# Patient Record
Sex: MALE | URBAN - METROPOLITAN AREA
[De-identification: names, ages, dates, MRNs, and addresses within clinical notes are randomized per-mention and may not be internally consistent; named-entity substitution may affect disease eponyms.]

---

## 2024-01-16 ENCOUNTER — ALLIED HEALTH (OUTPATIENT)
Dept: INTEGRATIVE MEDICINE | Facility: CLINIC | Age: 49
End: 2024-01-16
Payer: COMMERCIAL

## 2024-01-16 DIAGNOSIS — M54.2 CERVICALGIA: ICD-10-CM

## 2024-01-16 DIAGNOSIS — M79.10 MYALGIA: ICD-10-CM

## 2024-01-16 DIAGNOSIS — M99.01 SEGMENTAL AND SOMATIC DYSFUNCTION OF CERVICAL REGION: Primary | ICD-10-CM

## 2024-01-16 DIAGNOSIS — M99.03 SEGMENTAL AND SOMATIC DYSFUNCTION OF LUMBAR REGION: ICD-10-CM

## 2024-01-16 DIAGNOSIS — M99.02 SEGMENTAL AND SOMATIC DYSFUNCTION OF THORACIC REGION: ICD-10-CM

## 2024-01-16 DIAGNOSIS — M79.9 POSTURAL STRAIN: ICD-10-CM

## 2024-01-16 PROCEDURE — 99202 OFFICE O/P NEW SF 15 MIN: CPT | Performed by: CHIROPRACTOR

## 2024-01-16 PROCEDURE — 98941 CHIROPRACT MANJ 3-4 REGIONS: CPT | Performed by: CHIROPRACTOR

## 2024-01-16 ASSESSMENT — ENCOUNTER SYMPTOMS
DIZZINESS: 0
NECK STIFFNESS: 1
UNEXPECTED WEIGHT CHANGE: 0
ARTHRALGIAS: 0
NECK PAIN: 1
BACK PAIN: 0
WEAKNESS: 0
NUMBNESS: 0
DIFFICULTY URINATING: 0
LIGHT-HEADEDNESS: 0
FATIGUE: 0
TROUBLE SWALLOWING: 0
CHEST TIGHTNESS: 0
HEADACHES: 0
JOINT SWELLING: 0
VOMITING: 0
FEVER: 0
CHILLS: 0
MYALGIAS: 1
SHORTNESS OF BREATH: 0
FLANK PAIN: 0

## 2024-01-16 NOTE — PROGRESS NOTES
Subjective   Patient ID: Louie Huynh is a 49 y.o. male who presents today, January 16, 2024 for a new patient evaluation and treatment of neck pain.    (1/20) [facility]    Chiropractic Medicine HPI:  Provacative factors include : sitting  Palliative factors incude : movement stretching  Pain is described as : ache dull stiff   Previous treatment for complaint has included : exercise  Patient denies : difficulty walking bladder weakness bowel weakness catching clicking grinding instability numbness popping radiating pain into the distal extremity trauma  Intensity of the pain since last visit: Patient rates least severe pain at : 1/10 Patient rates most severe pain at : 4/10  Oswestry Disability Index has been completed: yes     Louie presents for evaluation and treatment of neck pain and stiffness. Patient states that he fractured his right collar bone about 20 years ago playing basketball. He states that since then he has had intermittent neck pain and stiffness. He states that he occasionally has right arm/shoulder weakness. He states that symptoms are mild today. He states that he sprained his right ankle in July and has residual ankle pain and tightness. He states that he has pain when he runs. Patient denies any headaches, difficulty speaking/swallowing, vision changes, bowel/bladder issues, chest pain/shortness of breath, numbness/tingling. He states that he had chiropractic care about 8 years ago and noted benefit from care.            Objective   Review of Systems   Constitutional:  Negative for chills, fatigue, fever and unexpected weight change.   HENT:  Negative for trouble swallowing.    Eyes:  Negative for visual disturbance.   Respiratory:  Negative for chest tightness and shortness of breath.    Cardiovascular:  Negative for chest pain and leg swelling.   Gastrointestinal:  Negative for vomiting.   Genitourinary:  Negative for difficulty urinating and flank pain.   Musculoskeletal:  Positive for  myalgias, neck pain and neck stiffness. Negative for arthralgias, back pain, gait problem and joint swelling.   Neurological:  Negative for dizziness, weakness, light-headedness, numbness and headaches.   Psychiatric/Behavioral:  Negative for self-injury and suicidal ideas.    All other systems reviewed and are negative.    Physical Exam  Constitutional:       General: He is not in acute distress.     Appearance: Normal appearance.       HENT:      Head: Normocephalic and atraumatic.   Musculoskeletal:      Cervical back: Tenderness present. Decreased range of motion.      Thoracic back: Tenderness present.      Lumbar back: Tenderness present.   Neurological:      General: No focal deficit present.      Mental Status: He is alert and oriented to person, place, and time.      Cranial Nerves: No dysarthria or facial asymmetry.      Sensory: Sensation is intact.      Motor: Motor function is intact.      Coordination: Coordination is intact.      Gait: Gait is intact.   Psychiatric:         Attention and Perception: Attention normal.         Mood and Affect: Mood normal.         Speech: Speech normal.         Behavior: Behavior is cooperative.        Spine Musculoskeletal Exam    Gait    Gait is normal.    Inspection    Shoulder elevation: right    Cervical Spine    Cervical spine inspection additional comments: Forward head carriage.    Palpation    Cervical Spine    Tenderness: present      Paraspinous: right and left      Trapezius: right and left      Periscapular: right and left    Right      Muscle tone: increased    Left      Muscle tone: increased    Thoracolumbar    Tenderness: present      Paraspinous: right and left    Right      Muscle tone: increased    Left      Muscle tone: increased    Range of Motion    Cervical Spine       Cervical flexion: 1-2 finger breadths. Cervical flexion detail: no pain.     Cervical extension: reduced extension (0-25%). Cervical extension detail: pain.       Right       Lateral bending: reduced bend (0-25%). Lateral bending detail: pain.       Lateral rotation: reduced rotation (0-25%). Lateral rotation detail: no pain.       Left      Lateral bending: reduced bend (0-25%). Lateral bending detail: no pain.       Lateral rotation: reduced rotation (0-25%). Lateral rotation detail: no pain.      Strength    Cervical Spine    Cervical spine motor exam is normal.    Sensory    Cervical Spine    Cervical spine sensation is normal.    Special Tests    Cervical Spine    Cervical distraction - neck: decreased pain    General    Neurological: alert     Other Orthopedic Tests:  Cervical: Right Maximum compression with local pain.  Left Maximum compression painless.  Right Shoulder depression with local pain.  Left Shoulder depression painless.  Segmental Joint(s): Segmental joint dysfunction was assessed with motion palpation and is identified in the following areas:  Cervical : C4 C5 C6 C7  Thoracic : T2., T5, and T9  Lumbopelvic / Sacral SIJ : L4, L5, and L5/S1  Upper / Lower Extremities : R Ankle and R Talocrural      Assessment/Plan   Today's Treatment Included: Chiropractic manipulation to the Segmental Joint(s) Cervical : C4 C5 C6 C7 Segmental Joint(s) Lumbopelvic/Sacral SIJ : L4, L5, and L5/S1 Segmental Joint(s) Thoracic : T2., T5, and T9 Segmental Joints Upper/Lower Extremities : R Ankle and R Talocrural  Treatment Techniques Used : Diversified CMT and Manual traction  Pin and stretch    Soft-tissue mobilization was performed in the following areas:   Cervical paraspinal mm. bilateral, Upper Trapezius bilateral, and Levator Scap. bilateral  Thoracic Paraspinal mm. bilateral  Lumbar Paraspinal mm. bilateral      Supraspinatus right and Pectoralis mm. right      Treatment Plan: The patient and I discussed the risks and benefits of Chiropractic Care.  Based on the patient's subjective complaints along with the examination findings, it is advised that a course of Chiropractic Treatment  by initiated in the form of:   Chiropractic Manipulation (CMT)  Neuro-Muscular Re-education  Integrative Dry Needing (IDN)  Chiropractic treatment recommended at a frequency of 1 times per week for 1 weeks  Consent for care was given both written and orally by the patient.  The goals of the treatment will be to: decrease pain, increase range of motion, improve quality of life, and decrease muscular hypertonicity  The patient tolerated today's treatment with little or no additional discomfort and was instructed to contact the office for questions or concerns.   Follow up as scheduled.

## 2024-01-24 ENCOUNTER — OFFICE VISIT (OUTPATIENT)
Dept: ORTHOPEDIC SURGERY | Facility: HOSPITAL | Age: 49
End: 2024-01-24
Payer: COMMERCIAL

## 2024-01-24 ENCOUNTER — HOSPITAL ENCOUNTER (OUTPATIENT)
Dept: RADIOLOGY | Facility: HOSPITAL | Age: 49
Discharge: HOME | End: 2024-01-24
Payer: COMMERCIAL

## 2024-01-24 VITALS — BODY MASS INDEX: 27.28 KG/M2 | WEIGHT: 180 LBS | HEIGHT: 68 IN

## 2024-01-24 DIAGNOSIS — S93.401A MODERATE RIGHT ANKLE SPRAIN, INITIAL ENCOUNTER: Primary | ICD-10-CM

## 2024-01-24 DIAGNOSIS — M25.571 RIGHT ANKLE PAIN, UNSPECIFIED CHRONICITY: ICD-10-CM

## 2024-01-24 PROCEDURE — 99213 OFFICE O/P EST LOW 20 MIN: CPT | Performed by: PHYSICIAN ASSISTANT

## 2024-01-24 PROCEDURE — 73610 X-RAY EXAM OF ANKLE: CPT | Mod: RIGHT SIDE | Performed by: RADIOLOGY

## 2024-01-24 PROCEDURE — 99203 OFFICE O/P NEW LOW 30 MIN: CPT | Performed by: PHYSICIAN ASSISTANT

## 2024-01-24 PROCEDURE — 73610 X-RAY EXAM OF ANKLE: CPT | Mod: RT

## 2024-01-24 ASSESSMENT — PAIN SCALES - GENERAL: PAINLEVEL_OUTOF10: 2

## 2024-01-24 ASSESSMENT — PAIN - FUNCTIONAL ASSESSMENT: PAIN_FUNCTIONAL_ASSESSMENT: 0-10

## 2024-01-24 ASSESSMENT — PAIN DESCRIPTION - DESCRIPTORS: DESCRIPTORS: ACHING

## 2024-01-25 NOTE — PROGRESS NOTES
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 He states that in July of 2023, he was playing basketball with his son and landed on someone's foot, causing him to invert and sprain his ankle. Since that injury, he states his ankle has not healed well. He states his pain is sharp and \"annoying\" in nature. His says it is located on the lateral aspect of his ankle and also the posterior aspect. The pain comes and goes but is most prominent when trying physical activity such as running or playing sports. However, after going on a run twice this weekend, he states it was feeling better. He has tried physical therapy without relief. This PT was located in [place], which he states is too far from his home to have gone consistently. Last month in December, he states he had an MRI completed at [facility] and the results were negative for any injury. He is here today looking for more resources to rehab his ankle closer to home. He denies new injury, numbness or tingling in the foot or ankle.    No past medical history on file.    No past surgical history on file.        ROS:  All pertinent positives listed above in the HPI.    EXAM: RLE  Compartments warm and compressible  Skin without lacerations, swelling, ecchymosis, or sign of injury or infection  TTP along the lateral and posterior right ankle  ROM intact  Strength 5/5  PT/DP pulses 2+    IMAGING  Remote appearing avulsion fracture of the medial malleolus. Lucency  through the medial talar dome consistent with an osteochondral  defect. No acute fracture or dislocation seen. No degenerative  changes.    ASSESSMENT:  Right ankle sprain with continued pain    PLAN:  We discussed with the patient that this is likely an old injury that did not heal properly and he would benefit from functional rehabilitation. We provided the patient with resources for PT near home as well as names and contacts for foot and ankle " specialists.  Advised he bring his MRI images to his follow up if he continues to have pain.  We recommended activity as tolerated and he may follow up as needed.

## 2024-03-01 NOTE — PROGRESS NOTES
Physical Therapy  Physical Therapy Orthopedic Evaluation    Patient Name: Louie Huynh  MRN: [id number]  Today's Date: 3/1/2024       Insurance:  Visit number: *** of ***  Authorization info: ***  Insurance Type: ***  Cert start date: ***; Cert end date: ***     General:  Reason for visit: Moderate R ankle sprain  Referred by: Mindy August PA-C    Assessment: Patient presents with signs and symptoms consistent with ***, resulting in limited participation in pain-free ADLs and inability to perform at their prior level of function. Pt would benefit from physical therapy to address the impairments found & listed previously in the objective section in order to return to safe and pain-free ADLs and prior level of function.       Plan:     Planned Interventions include: therapeutic exercise, self-care home management, manual therapy, therapeutic activities, gait training, neuromuscular coordination, vasopneumatic, dry needling, aquatic therapy  Frequency: {Frequency:99932}  Duration: {Duration:07436}    Current Problem:  No diagnosis found.    Precautions:          Medical History Form: Reviewed (scanned into chart)    Subjective:   Subjective   Chief Complaint: ***  Onset: ***  LISA: ***    Current Condition:   {Current Condition:51310}    Pain:     Location: ***  Description: ***  Aggravating Factors:  {Aggravating Factors:40385}  Relieving Factors:  {Relieving Factors:75087}    Relevant Information (PMH & Previous Tests/Imaging): Pt presents to PT with c/o chronic R posterolateral ankle pain after sustaining inversion injury in July of 2023 while playing basketball. Pain is sharp and exacerbated with physical activity. H/o PT but notes fair attendance.   R Ankle XR 1/24/24: osteochondral defect in medial talar dome, remove avulsion injury to medial malleolus   Previous Interventions/Treatments: {Previous Interventions/Treatments:12217}    Prior Level of Function (PLOF)  Patient previously independent with all  ADLs  Exercise/Physical Activity: ***  Work/School: ***    Patients Living Environment: Reviewed and no concern    Primary Language: English    Patient's Goal(s) for Therapy: ***    Patient Specific Functional Scale (0 = unable to perform; 10 = able to perform activity at same level as before injury or problem)  Activity Current Follow Up Discharge                     Total score = sum of the activity scores/number of activities  Minimum detectable change (90%CI) for average score = 2 points  Minimum detectable change (90%CI) for single activity score = 3 points    Red Flags: Do you have any of the following? {Yes/No:48812}  Fever/chills, unexplained weight changes, dizziness/fainting, unexplained change in bowel or bladder functions, unexplained malaise or muscle weakness, night pain/sweats, numbness or tingling    Objective:  Objective     Posture: ***    Palpation: ***    Ankle AROM  DF R ***; L ***  PF R ***; L ***  Inv R ***; L ***  Ev R ***; L ***  Great toe ext R ***; L ***    Ankle MMT  DF R ***; L ***  Inv R ***; L ***  Ev R ***; L ***  Great toe ext R ***; L ***  Great toe flex R ***; L ***  Toe ext R ***; L ***  Toe flex R ***; L ***    Gross Hip/Knee MMT:  - R ***  - L ***    Special Tests:  Ankle Sprain:  - Medial Tilt Test: ***  - Lateral Tilt Test: ***  - Anterior Drawer Test: ***  - External Rotation Test: ***  Achilles Tendinopathy:  - Cash Test: ***  - Achilles Tendon Palpation: ***    WB Lunge DF ROM Test:  - R ***  - L ***  (>2.5 cm difference side to side increases risk of lateral ankle tyson)    SL Heel Raise  R ***  L ***    Girth  Figure 8: R ***; L ***  Malleoli: R ***; L ***    Joint Mobility  - Talocrural: R ***; L ***  - Subtalar: R ***; L ***    SL Balance  - R ***  - L ***    Lower Extremity Functional Movements  Transfers: ***  Gait: ***  SL Balance: ***  DL Squat: ***  SL Squat: ***    Outcome Measures:  {PT Outcome Measures:58127}     EDUCATION: Home exercise program, plan of  care, activity modifications, pain management, and injury pathology       Goals: Set and discussed today      Plan of care was developed with input and agreement by the patient    Treatment Performed:    Exercise Sets/Reps Comments                                        Charges:  Evaluation: *** complexity  Treatment: ***    Alessandra Conrad, PT

## 2024-03-05 ENCOUNTER — DOCUMENTATION (OUTPATIENT)
Dept: PHYSICAL THERAPY | Facility: CLINIC | Age: 49
End: 2024-03-05
Payer: COMMERCIAL

## 2024-03-05 ENCOUNTER — APPOINTMENT (OUTPATIENT)
Dept: PHYSICAL THERAPY | Facility: CLINIC | Age: 49
End: 2024-03-05
Payer: COMMERCIAL

## 2024-03-08 NOTE — PROGRESS NOTES
Physical Therapy  Physical Therapy Orthopedic Evaluation    Patient Name: Louie Huynh  MRN: [id number]  Today's Date: 3/12/2024  Time Calculation  Start Time: 1115  Stop Time: 1200  Time Calculation (min): 45 min    Insurance:  Visit number: 1 of 30  Authorization info: NAN  Insurance Type:  Healthy Choice Plan  Cert start date: NA; Cert end date: NA     General:  Reason for visit: Moderate R ankle sprain  Referred by: Mindy August PA-C    Assessment: Patient presents with signs and symptoms consistent with chronic ankle instability, pes planus, and possible achilles tendinopathy, resulting in limited participation in pain-free ADLs and inability to perform at their prior level of function. Pt would benefit from physical therapy to address the impairments found & listed previously in the objective section in order to return to safe and pain-free ADLs and prior level of function.       Plan:     Planned Interventions include: therapeutic exercise, self-care home management, manual therapy, therapeutic activities, gait training, neuromuscular coordination, vasopneumatic, dry needling, aquatic therapy  Frequency: once every 1-3 weeks  Duration: 8 visits    Current Problem:  1. Right ankle pain  Follow Up In Physical Therapy      2. Moderate right ankle sprain, initial encounter  Referral to Physical Therapy    Follow Up In Physical Therapy          Precautions:   Precautions  STEADI Fall Risk Score (The score of 4 or more indicates an increased risk of falling): 0  Precautions Comment: H/o R tibial fracture 6 years ago      Medical History Form: Reviewed (scanned into chart)    Subjective:   Subjective   Chief Complaint: Pt presents to PT with c/o chronic R posterolateral ankle pain after sustaining inversion injury in July of 2023 while playing basketball. Pain described as soreness and exacerbated with physical activity. H/o PT but notes fair attendance. Denies performing exercises since. Notes busy schedule d/t  "work/moving, did not address symptoms immediately, pushed through pain. Of note, R knee hyperextension injury 6 years ago, fractured tibia with residual achilles soreness. Additionally, h/o R plantar fasciitis. Has since returned to tennis and basketball, modified participation. Pt will be skiing in 2 weeks, states he skied 2 weeks ago and was cautious, felt discomfort. Moved 4x in the past year, increased heavy lifting during moves.  Onset: 7/2023  LISA: Inversion injury    Current Condition:   Better    Pain:  Pain Assessment: 0-10  Pain Score: 0 - No pain  Location: Lateral ankle, occ anterior ankle, occ calf/achilles  Description: Intermittent sore  Aggravating Factors:  tennis/basketball, descending>ascending stairs, not wearing a supportive shoe  Relieving Factors:  massage     Relevant Information (PMH & Previous Tests/Imaging):   R Ankle XR 1/24/24: osteochondral defect in medial talar dome, remote avulsion injury to medial malleolus; MRI at [facility]: bone marrow edema, ligaments/tendons unremarkable  Previous Interventions/Treatments: PT at [facility] (limited attendance d/t busy schedule/inconvenient location)    Prior Level of Function (PLOF) - 80%  Patient previously independent with all ADLs  Exercise/Physical Activity: lifting 2-3x/week, walking regularly, tennis 1x week, basketball at times, [employer]om[employer] at times, yoga at times  Work/School: Oncologist    Patients Living Environment: Reviewed and no concern    Primary Language: English    Patient's Goal(s) for Therapy: \"Back to functional health\"    Patient Specific Functional Scale (0 = unable to perform; 10 = able to perform activity at same level as before injury or problem)  Activity Current Follow Up Discharge   Running 7     Stairs 6     Tennis 5     Total score = sum of the activity scores/number of activities  Minimum detectable change (90%CI) for average score = 2 points  Minimum detectable change (90%CI) for single activity score = 3 points    Red " Flags: Do you have any of the following? No  Fever/chills, unexplained weight changes, dizziness/fainting, unexplained change in bowel or bladder functions, unexplained malaise or muscle weakness, night pain/sweats, numbness or tingling    Objective:  Objective     Posture: R>L pes planus, R>L calcaneal valgus    Palpation: medial>lateral plantar fascia, mid achilles, calcaneus    Ankle AROM  DF R 15 deg; L 13 deg  PF R 45 deg; L 50 deg  Inv R 45 deg; L 41 deg  Ev R 5 deg; L 12 deg  Great toe ext R 51 deg; L 57 deg    Ankle MMT  DF R 4+/5; L 5/5  Inv R 4+/5; L 5/5  Ev R 4+/5; L 5/5  Great toe ext R 4/5; L 4+/5  Great toe flex R 4/5; L 4+/5  Toe ext R 4/5; L 4+/5  Toe flex R 4/5; L 4+/5    Gross Hip/Knee MMT:  - R 4 to 4+/5  - L 4 to 4+/5    Special Tests:  Ankle Sprain:  - Medial Tilt Test: (+)  - Lateral Tilt Test: (-)  - Anterior Drawer Test: (-)  - External Rotation Test: DNT  Achilles Tendinopathy:  - Cash Test: (-)  - Achilles Tendon Palpation: (+)    WB Lunge DF ROM Test:  - R 7.5cm  - L 10.5cm  (>2.5 cm difference side to side increases risk of lateral ankle tyson)    SL Heel Raise  R 25 (pain throughout)  L 17    Girth  Figure 8: R 24.5cm; L 24.5cm  Malleoli: R 56cm; L 55cm    Joint Mobility  - Talocrural: WFL; L WFL  - Subtalar: R WFL; L WFL    SL Balance  - R 20s+, increased unsteadiness/ankle strategies  - L 20s+    Lower Extremity Functional Movements  Transfers: WFL  Gait: medial foot collapse during stance phase  DL Squat: decreased depth secondary to R DF limitations, wide KAYLA with minimal dynamic knee valgus R>L, B pes planus    Outcome Measures:  Other Measures  Lower Extremity Funtional Score (LEFS): 67/80     EDUCATION: Home exercise program, plan of care, activity modifications, pain management, and injury pathology       Goals: Set and discussed today  Active       PT Problem       PT Goal 1       Start:  03/12/24    Expected End:  04/26/24       Patient will demonstrate home exercise  program adherence to supplement symptom reduction and functional gains made in clinic            PT Goal 2       Start:  03/12/24    Expected End:  06/10/24       Pt will demonstrate gross ankle MMT 5/5 to assist in return to PLOF   Pt will demonstrate gross foot intrinsic MMT 5/5 to improve functional stability with SL tasks   Pt will demonstrate <2.5cm difference on WB lunge DF ROM test to assist in injury prevention  Pt will perform 25 SL heel raises without pain R/L to improve ease with sports participation  Pt will demonstrate MCID (9 points) improvement on LEFS score (>76 points) to demonstrate improved functional ability  Pt will demonstrate MCID improvement on PSFS to demonstrate return to PLOF             Plan of care was developed with input and agreement by the patient    Treatment Performed:    HEP - ask for access code - toe yoga, gastroc stretch, soleus stretch, resisted eversion    Charges:  Evaluation: low complexity  Treatment: 1 JANIYA Conrad, PT

## 2024-03-12 ENCOUNTER — APPOINTMENT (OUTPATIENT)
Dept: PHYSICAL THERAPY | Facility: CLINIC | Age: 49
End: 2024-03-12
Payer: COMMERCIAL

## 2024-03-12 ENCOUNTER — EVALUATION (OUTPATIENT)
Dept: PHYSICAL THERAPY | Facility: CLINIC | Age: 49
End: 2024-03-12
Payer: COMMERCIAL

## 2024-03-12 DIAGNOSIS — M25.571 RIGHT ANKLE PAIN: Primary | ICD-10-CM

## 2024-03-12 DIAGNOSIS — S93.401A MODERATE RIGHT ANKLE SPRAIN, INITIAL ENCOUNTER: ICD-10-CM

## 2024-03-12 PROCEDURE — 97110 THERAPEUTIC EXERCISES: CPT | Mod: GP | Performed by: PHYSICAL THERAPIST

## 2024-03-12 PROCEDURE — 97161 PT EVAL LOW COMPLEX 20 MIN: CPT | Mod: GP | Performed by: PHYSICAL THERAPIST

## 2024-03-12 ASSESSMENT — PAIN - FUNCTIONAL ASSESSMENT: PAIN_FUNCTIONAL_ASSESSMENT: 0-10

## 2024-03-12 ASSESSMENT — ENCOUNTER SYMPTOMS
DEPRESSION: 0
OCCASIONAL FEELINGS OF UNSTEADINESS: 0
LOSS OF SENSATION IN FEET: 0

## 2024-03-12 ASSESSMENT — PAIN SCALES - GENERAL: PAINLEVEL_OUTOF10: 0 - NO PAIN

## 2024-03-15 NOTE — PROGRESS NOTES
"Physical Therapy  Physical Therapy Treatment    Patient Name: Louie Huynh  MRN: [id number]  Today's Date: 3/18/2024  Time Calculation  Start Time: 1738  Stop Time: 1836  Time Calculation (min): 58 min    Insurance:  Visit number: 2 of 30  Authorization info: NAN  Insurance Type:  Healthy Choice Plan  Cert start date: NA; Cert end date: NA      General:  Reason for visit: Moderate R ankle sprain  Referred by: Mindy August PA-C    Assessment: Good tolerance to prescribed exercise, noting appropriate challenge with most exercises. Denies change in symptoms after exercise. Updating HEP and instructed in performance. Ending session with vaso to assist in symptom and swelling management.        Plan: Continue to address strength, proprioception, and SL balance       Current Problem  1. Moderate right ankle sprain, initial encounter        2. Right ankle pain            Precautions:   Precautions  STEADI Fall Risk Score (The score of 4 or more indicates an increased risk of falling): 0  Precautions Comment: H/o R tibial fracture 6 years ago    Subjective:  Subjective   Pt presents to PT with c/o 3-4/10 ankle pain. Notes HEP adherence - \"I can definitely feel the soleus stretch\". Played tennis yesterday, feeling sore today.     Pain  Pain Assessment: 0-10  Pain Score: 4    Performing HEP?: Yes    Objective:  Objective   Unsteadiness with SLS    Treatments:    Exercise:  - Upright bike L3 5'   - Toe yoga x10  - Toe spread 2x10  - Resisted eversion GTB 2x10-15  - Resisted inversion GTB 2x10-15  - Heel raise off step x15, DL up/SL down x10  - 3 way heel raise x10-15 ea  - Soleus heel raise 2x10-15  - Standing DF 2x10-15  - SS GTB (around feet) x2 laps  - Step up + HK to BOSU 2x10  - SL balance on balance board x60\"  - 3 way ball toss SLS x15 ea  - DBE L12 UUE support x1, without UE support x1 (1:18)  - Y balance x8  - Gastroc slantboard stretch 90\"  - Soleus slantboard stretch 90\"    HEP - Access Code [email or web address] - toe yoga, " gastroc stretch, soleus stretch, resisted eversion     Alessandra Conrad, PT

## 2024-03-18 ENCOUNTER — TREATMENT (OUTPATIENT)
Dept: PHYSICAL THERAPY | Facility: CLINIC | Age: 49
End: 2024-03-18
Payer: COMMERCIAL

## 2024-03-18 DIAGNOSIS — M25.571 RIGHT ANKLE PAIN: ICD-10-CM

## 2024-03-18 DIAGNOSIS — S93.401A MODERATE RIGHT ANKLE SPRAIN, INITIAL ENCOUNTER: Primary | ICD-10-CM

## 2024-03-18 PROCEDURE — 97112 NEUROMUSCULAR REEDUCATION: CPT | Mod: GP | Performed by: PHYSICAL THERAPIST

## 2024-03-18 PROCEDURE — 97110 THERAPEUTIC EXERCISES: CPT | Mod: GP | Performed by: PHYSICAL THERAPIST

## 2024-03-18 PROCEDURE — 97016 VASOPNEUMATIC DEVICE THERAPY: CPT | Mod: GP | Performed by: PHYSICAL THERAPIST

## 2024-03-18 ASSESSMENT — PAIN SCALES - GENERAL: PAINLEVEL_OUTOF10: 4

## 2024-03-18 ASSESSMENT — PAIN - FUNCTIONAL ASSESSMENT: PAIN_FUNCTIONAL_ASSESSMENT: 0-10

## 2024-04-05 NOTE — PROGRESS NOTES
"Physical Therapy  Physical Therapy Treatment    Patient Name: Louie Huynh  MRN: [id number]  Today's Date: 4/5/2024       Insurance:  Visit number: 3 of 30  Authorization info: NAN  Insurance Type:  Healthy Choice Plan  Cert start date: NA; Cert end date: NA      General:  Reason for visit: Moderate R ankle sprain  Referred by: Mindy August PA-C    Assessment: Good tolerance to prescribed exercise, noting appropriate challenge with most exercises. Denies change in symptoms after exercise. Updating HEP and instructed in performance. Ending session with vaso to assist in symptom and swelling management.        Plan: Continue to address strength, proprioception, and SL balance       Current Problem  No diagnosis found.      Precautions:        Subjective:  Subjective   Pt presents to PT with c/o 3-4/10 ankle pain. Notes HEP adherence - \"I can definitely feel the soleus stretch\". Played tennis yesterday, feeling sore today.     Pain       Performing HEP?: Yes    Objective:  Objective   Unsteadiness with SLS    Treatments:    Exercise:  - Upright bike L3 5'   - Toe yoga x10  - Toe spread 2x10  - Resisted eversion GTB 2x10-15  - Resisted inversion GTB 2x10-15  - Heel raise off step x15, DL up/SL down x10  - 3 way heel raise x10-15 ea  - Soleus heel raise 2x10-15  - Standing DF 2x10-15  - SS GTB (around feet) x2 laps  - Step up + HK to BOSU 2x10  - SL balance on balance board x60\"  - 3 way ball toss SLS x15 ea  - DBE L12 UUE support x1, without UE support x1 (1:18)  - Y balance x8  - Gastroc slantboard stretch 90\"  - Soleus slantboard stretch 90\"    HEP - Access Code [email or web address] - toe yoga, gastroc stretch, soleus stretch, resisted eversion     Alessandra Conrad, PT  "

## 2024-04-09 ENCOUNTER — APPOINTMENT (OUTPATIENT)
Dept: PHYSICAL THERAPY | Facility: CLINIC | Age: 49
End: 2024-04-09
Payer: COMMERCIAL

## 2024-04-24 NOTE — PROGRESS NOTES
"Physical Therapy  Physical Therapy Treatment    Patient Name: Louie Huynh  MRN: [id number]  Today's Date: 4/30/2024       Insurance:  Visit number: 3 of 30  Authorization info: NAN  Insurance Type:  Healthy Choice Plan  Cert start date: NA; Cert end date: NA      General:  Reason for visit: Moderate R ankle sprain  Referred by: Mindy August PA-C    Assessment: Good tolerance to progressions in clinic. Emphasis on controlled movement and technique. Notes challenge with drills. Denies pain after session. Reinforcing proprioception drills.        Plan: Continue to address strength, proprioception, and SL balance       Current Problem  1. Moderate right ankle sprain, initial encounter        2. Right ankle pain              Precautions:   Precautions  STEADI Fall Risk Score (The score of 4 or more indicates an increased risk of falling): 0  Precautions Comment: H/o R tibial fracture 6 years ago    Subjective:  Subjective   Pt presents to PT without pain. Reports some HEP adherence 3-4x/week, \"I'm not working on the proprioception drills.\" States increased pain/swelling after pushing himself skiing, lasting ~1 week. Reports slow mild improvement with exercise.     Pain  Pain Assessment: 0-10  Pain Score: 0 - No pain    Performing HEP?: Yes    Objective:  Objective   Unsteadiness with SLS    Treatments:    Exercise:  - Upright bike L3 5'   - Toe yoga x10  - Toe spread 2x10  - Reviewing sock stretch  - Resisted eversion GTB 2x15  - DL up/SL down 2x10  - 3 way heel raise x10-15 ea  - Soleus heel raise 2x15 8kg KB - increase resistance NV  - Standing DF 2x10-15  - SS GTB (X around feet) x2 laps  - Step up + HK to BOSU 2x10  - SL balance on balance board x60\"  - Squat with BTB to cue knee alignment 2x10-15  - Standing hip abd BTB 2x10  - Steamboats BTB 2x10  - Partial lunge 2x10  - 3 way ball toss air exSLS x15 ea  - Biodex L12 without UE support x1 (52s)  - Y balance x8 - held 4/30  - Gastroc slantboard stretch 90\"  - " "Soleus slantboard stretch 90\"    HEP - Access Code [email or web address] - toe yoga, gastroc stretch, soleus stretch, resisted eversion     Alessandra Conrad, PT  "

## 2024-04-30 ENCOUNTER — TREATMENT (OUTPATIENT)
Dept: PHYSICAL THERAPY | Facility: CLINIC | Age: 49
End: 2024-04-30
Payer: COMMERCIAL

## 2024-04-30 DIAGNOSIS — S93.401A MODERATE RIGHT ANKLE SPRAIN, INITIAL ENCOUNTER: Primary | ICD-10-CM

## 2024-04-30 DIAGNOSIS — M25.571 RIGHT ANKLE PAIN: ICD-10-CM

## 2024-04-30 PROCEDURE — 97110 THERAPEUTIC EXERCISES: CPT | Mod: GP | Performed by: PHYSICAL THERAPIST

## 2024-04-30 PROCEDURE — 97112 NEUROMUSCULAR REEDUCATION: CPT | Mod: GP | Performed by: PHYSICAL THERAPIST

## 2024-04-30 ASSESSMENT — PAIN - FUNCTIONAL ASSESSMENT: PAIN_FUNCTIONAL_ASSESSMENT: 0-10

## 2024-04-30 ASSESSMENT — PAIN SCALES - GENERAL: PAINLEVEL_OUTOF10: 0 - NO PAIN

## 2024-05-24 NOTE — PROGRESS NOTES
"Physical Therapy  Physical Therapy Treatment    Patient Name: Louie Huynh  MRN: [id number]  Today's Date: 5/28/2024  Time Calculation  Start Time: 1115  Stop Time: 1218  Time Calculation (min): 63 min    Insurance:  Visit number: 4 of 30  Authorization info: NAN  Insurance Type:  Healthy Choice Plan  Cert start date: NA; Cert end date: NA      General:  Reason for visit: Moderate R ankle sprain  Referred by: Mindy August PA-C    Assessment: Reports decreased pain after AP TC jt mobs. Tolerating exercise well, reporting decrease in pain to 2/10 after exercise. Ending session with vaso for symptom management.       Plan: Continue to address strength, proprioception, and SL balance. Continue TC mobs as needed. Recheck NV. Add power and agility when able.       Current Problem  1. Moderate right ankle sprain, initial encounter        2. Right ankle pain                Precautions:   Precautions  STEADI Fall Risk Score (The score of 4 or more indicates an increased risk of falling): 0  Precautions Comment: H/o R tibial fracture 6 years ago    Subjective:  Subjective   Pt presents to PT with R anterior ankle soreness, rating as 4/10. States he played tennis yesterday. Denies pain with tennis, but feels sore afterward. Improved activity tolerance since beginning PT POC. Reports HEP adherence, however, some inconsistent due to being on call last week.     Pain  Pain Assessment: 0-10  Pain Score: 4    Performing HEP?: Yes    Objective:  Objective   Decreased pain post AP TC jt hypomobility    Treatments:  Manual: GIII AP TC jt mobs, TC jt distraction     Exercise:  - Upright bike L3 5'   - Resisted eversion GTB 2x15 - held 5/28  - DL up/SL down 2x10  - 3 way heel raise 2x10-15 ea  - Soleus heel raise 2x15 12kg KB  - Standing DF 2x10-15  - SS GTB (X around feet) x2 laps - held 5/28  - Step up + HK to BOSU 2x10  - SL balance on balance board x60\" - held 5/28  - Squat with BTB to cue knee alignment 2x10-15  - Standing hip abd " "BTB 2x10  - Steamboats BTB 2x10  - Partial lunge 4kg KB 2x10  - 3 way ball toss air ex SLS x10 ea  - Biodex L12 without UE support x1 (57s)  - Y balance x8   - Gastroc slantboard stretch 90\"  - Soleus slantboard stretch 90\"    HEP - Access Code [email or web address]    Alessandra Cnorad, PT  "

## 2024-05-28 ENCOUNTER — TREATMENT (OUTPATIENT)
Dept: PHYSICAL THERAPY | Facility: CLINIC | Age: 49
End: 2024-05-28
Payer: COMMERCIAL

## 2024-05-28 DIAGNOSIS — M25.571 RIGHT ANKLE PAIN: ICD-10-CM

## 2024-05-28 DIAGNOSIS — S93.401A MODERATE RIGHT ANKLE SPRAIN, INITIAL ENCOUNTER: Primary | ICD-10-CM

## 2024-05-28 PROCEDURE — 97016 VASOPNEUMATIC DEVICE THERAPY: CPT | Mod: GP | Performed by: PHYSICAL THERAPIST

## 2024-05-28 PROCEDURE — 97110 THERAPEUTIC EXERCISES: CPT | Mod: GP | Performed by: PHYSICAL THERAPIST

## 2024-05-28 PROCEDURE — 97112 NEUROMUSCULAR REEDUCATION: CPT | Mod: GP | Performed by: PHYSICAL THERAPIST

## 2024-05-28 ASSESSMENT — PAIN SCALES - GENERAL: PAINLEVEL_OUTOF10: 4

## 2024-05-28 ASSESSMENT — PAIN - FUNCTIONAL ASSESSMENT: PAIN_FUNCTIONAL_ASSESSMENT: 0-10

## 2024-06-18 ENCOUNTER — TREATMENT (OUTPATIENT)
Dept: PHYSICAL THERAPY | Facility: CLINIC | Age: 49
End: 2024-06-18
Payer: COMMERCIAL

## 2024-06-18 DIAGNOSIS — S93.401A MODERATE RIGHT ANKLE SPRAIN, INITIAL ENCOUNTER: Primary | ICD-10-CM

## 2024-06-18 DIAGNOSIS — M25.571 RIGHT ANKLE PAIN: ICD-10-CM

## 2024-06-18 PROCEDURE — 97110 THERAPEUTIC EXERCISES: CPT | Mod: GP | Performed by: PHYSICAL THERAPIST

## 2024-07-29 NOTE — PROGRESS NOTES
Physical Therapy  Physical Therapy Treatment    Patient Name: Louie Huynh  MRN: [id number]  Today's Date: 7/30/2024  Time Calculation  Start Time: 1032  Stop Time: 1113  Time Calculation (min): 41 min    Insurance:  Visit number: 6 of 30  Authorization info: NAN  Insurance Type:  Healthy Choice Plan  Cert start date: NA; Cert end date: NA      General:  Reason for visit: Moderate R ankle sprain  Referred by: Mindy August PA-C    Assessment: Pt notes pain with jumping that eases after AP TC jt mob. Educated on performing independently. Pt continues to exhibit R gastroc/soleus strength deficits as he is unable to perform SL heel raise without knee bend. Reinforcing DL up/SL down heel raises and DL power heel raises at home to improve strength before attempting hopping again. Tolerating agility well. Denies pain at end of session.       Plan: Continue TC mobs as needed. Continue to progress strength, power, and agility.       Current Problem  1. Moderate right ankle sprain, initial encounter  Follow Up In Physical Therapy      2. Right ankle pain  Follow Up In Physical Therapy                Precautions:   Precautions  STEADI Fall Risk Score (The score of 4 or more indicates an increased risk of falling): 0  Precautions Comment: H/o R tibial fracture 6 years ago    Subjective:  Subjective   Pt presents to PT without ankle pain, however, notes becoming frustrated when playing tennis or running uphill as he experiences R anterior ankle pain. Reports some HEP adherence.    Pain  Pain Assessment: 0-10  0-10 (Numeric) Pain Score: 4    Performing HEP?: Yes    Objective:  Objective   Decreased pain post AP TC jt hypomobility    Treatments:  Manual: GIII AP TC jt mobs, 1/2 kneeling MWM, educated on performing independently with band     Exercise:  - Upright bike L3 5'   - Heel raises off step 3x15 (power)  - DL hops 2x30 (increasing pain - relieved with AP TC jt mob)  - Agility: fwd step to, side step to, in/out  - Toe  "walking fwd x2 laps, lateral x2 laps   - Resisted walking (all 4 directions) x10 ea    Held 7/30:  - Resisted eversion GTB 2x15  - DL up/SL down 2x10  - 3 way heel raise 2x10-15 ea  - Soleus heel raise 2x15 12kg KB  - Standing DF 2x10-15  - SS GTB (X around feet) x2 laps  - Step up + HK to BOSU 2x10  - SL balance on balance board x60\"   - Squat with BTB to cue knee alignment 2x10-15  - Standing hip abd BTB 2x10  - Steamboats BTB 2x10  - Partial lunge 4kg KB 2x10  - 3 way ball toss air ex SLS x10 ea  - Biodex L12 without UE support x1 (57s)  - Y balance x8   - Gastroc slantboard stretch 90\"  - Soleus slantboard stretch 90\"    HEP - Access Code [email or web address]    Alessandra Conrad, PT  "

## 2024-07-30 ENCOUNTER — TREATMENT (OUTPATIENT)
Dept: PHYSICAL THERAPY | Facility: CLINIC | Age: 49
End: 2024-07-30
Payer: COMMERCIAL

## 2024-07-30 DIAGNOSIS — S93.401A MODERATE RIGHT ANKLE SPRAIN, INITIAL ENCOUNTER: ICD-10-CM

## 2024-07-30 DIAGNOSIS — M25.571 RIGHT ANKLE PAIN: ICD-10-CM

## 2024-07-30 PROCEDURE — 97140 MANUAL THERAPY 1/> REGIONS: CPT | Mod: GP | Performed by: PHYSICAL THERAPIST

## 2024-07-30 PROCEDURE — 97110 THERAPEUTIC EXERCISES: CPT | Mod: GP | Performed by: PHYSICAL THERAPIST

## 2024-07-30 ASSESSMENT — PAIN - FUNCTIONAL ASSESSMENT: PAIN_FUNCTIONAL_ASSESSMENT: 0-10

## 2024-07-30 ASSESSMENT — PAIN SCALES - GENERAL: PAINLEVEL_OUTOF10: 4

## 2024-08-13 ENCOUNTER — APPOINTMENT (OUTPATIENT)
Dept: PHYSICAL THERAPY | Facility: CLINIC | Age: 49
End: 2024-08-13
Payer: COMMERCIAL

## 2024-08-26 NOTE — PROGRESS NOTES
Physical Therapy  Physical Therapy Treatment    Patient Name: Louie Huynh  MRN: [id number]  Today's Date: 8/27/2024  Time Calculation  Start Time: 1030  Stop Time: 1115  Time Calculation (min): 45 min    Insurance:  Visit number: 7 of 30  Authorization info: NAN  Insurance Type:  Healthy Choice Plan  Cert start date: NA; Cert end date: NA      General:  Reason for visit: Moderate R ankle sprain  Referred by: Mindy August PA-C    Assessment: Pt able to perform SL heel raise with good form, demonstrating improved strength. Tolerating jumping this date without pain. Revisiting orthotics discussion this date, pt scheduling with Liborio Roach PTA. At end of session, denies pain. Recheck NV, likely pt will take time off to strength training independently d/t busy schedule over the next few months.       Plan: Continue TC mobs as needed. Continue to progress strength, power, and agility. SL hops NV. Recheck NV.       Current Problem  1. Right ankle pain  Follow Up In Physical Therapy      2. Moderate right ankle sprain, initial encounter  Follow Up In Physical Therapy                  Precautions:   Precautions  STEADI Fall Risk Score (The score of 4 or more indicates an increased risk of falling): 0  Precautions Comment: H/o R tibial fracture 6 years ago    Subjective:  Subjective   Pt presents to PT without ankle pain, stating he has been more consistent with HEP. Notes he hasn't been participating in sports d/t busy schedule. Has not tried running uphill, but running on flat surfaces is pain free. Believes he's seeing slow progress. Notes lifting heavy objects 2 days ago with minor increase in pain of steps the wrong way.    Pain  Pain Assessment: 0-10  0-10 (Numeric) Pain Score: 0 - No pain    Performing HEP?: Yes    Objective:  Objective   SL heel raise on RLE with knee extension through full range    Treatments:  Manual: GIII AP TC jt mobs    Exercise:  - Upright bike L4 5'   - Heel raises off step 3x15 (power)  -  "DL hops fwd/back 2x30   - DL hops side/side 2x30  - Agility: fwd step to, side step to, in/out  - Toe walking fwd x2 laps, lateral x2 laps   - Step up with high knee to heel raise 2x12 R/L  - Wall sit heel raise 2x15  - Resisted walking (all 4 directions) x10 ea    Held 8/27:  - Resisted eversion GTB 2x15  - DL up/SL down 2x10  - 3 way heel raise 2x10-15 ea  - Soleus heel raise 2x15 12kg KB  - Standing DF 2x10-15  - SS GTB (X around feet) x2 laps  - Step up + HK to BOSU 2x10  - SL balance on balance board x60\"   - Squat with BTB to cue knee alignment 2x10-15  - Standing hip abd BTB 2x10  - Steamboats BTB 2x10  - Partial lunge 4kg KB 2x10  - 3 way ball toss air ex SLS x10 ea  - Biodex L12 without UE support x1 (57s)  - Y balance x8   - Gastroc slantboard stretch 90\"  - Soleus slantboard stretch 90\"    Charges: 3 TE    HEP - Access Code [email or web address]    Alessandra Conrad, PT  "

## 2024-08-27 ENCOUNTER — TREATMENT (OUTPATIENT)
Dept: PHYSICAL THERAPY | Facility: CLINIC | Age: 49
End: 2024-08-27
Payer: COMMERCIAL

## 2024-08-27 DIAGNOSIS — M25.571 RIGHT ANKLE PAIN: Primary | ICD-10-CM

## 2024-08-27 DIAGNOSIS — S93.401A MODERATE RIGHT ANKLE SPRAIN, INITIAL ENCOUNTER: ICD-10-CM

## 2024-08-27 PROCEDURE — 97110 THERAPEUTIC EXERCISES: CPT | Mod: GP | Performed by: PHYSICAL THERAPIST

## 2024-08-27 ASSESSMENT — PAIN SCALES - GENERAL: PAINLEVEL_OUTOF10: 0 - NO PAIN

## 2024-08-27 ASSESSMENT — PAIN - FUNCTIONAL ASSESSMENT: PAIN_FUNCTIONAL_ASSESSMENT: 0-10

## 2024-09-03 ENCOUNTER — APPOINTMENT (OUTPATIENT)
Dept: PHYSICAL THERAPY | Facility: CLINIC | Age: 49
End: 2024-09-03
Payer: COMMERCIAL

## 2024-09-17 ENCOUNTER — APPOINTMENT (OUTPATIENT)
Dept: PHYSICAL THERAPY | Facility: CLINIC | Age: 49
End: 2024-09-17
Payer: COMMERCIAL

## 2024-09-26 ENCOUNTER — OFFICE VISIT (OUTPATIENT)
Dept: PRIMARY CARE | Facility: CLINIC | Age: 49
End: 2024-09-26
Payer: COMMERCIAL

## 2024-09-26 ENCOUNTER — APPOINTMENT (OUTPATIENT)
Dept: PHYSICAL THERAPY | Facility: CLINIC | Age: 49
End: 2024-09-26
Payer: COMMERCIAL

## 2024-09-26 ENCOUNTER — LAB (OUTPATIENT)
Dept: LAB | Facility: LAB | Age: 49
End: 2024-09-26
Payer: COMMERCIAL

## 2024-09-26 VITALS
HEART RATE: 53 BPM | OXYGEN SATURATION: 97 % | WEIGHT: 187 LBS | BODY MASS INDEX: 28.34 KG/M2 | DIASTOLIC BLOOD PRESSURE: 89 MMHG | SYSTOLIC BLOOD PRESSURE: 131 MMHG | HEIGHT: 68 IN

## 2024-09-26 DIAGNOSIS — T14.8XXA BRUISING: ICD-10-CM

## 2024-09-26 DIAGNOSIS — L29.9 ITCH: ICD-10-CM

## 2024-09-26 DIAGNOSIS — E55.9 VITAMIN D DEFICIENCY: ICD-10-CM

## 2024-09-26 DIAGNOSIS — R21 RASH: ICD-10-CM

## 2024-09-26 DIAGNOSIS — Z00.00 ROUTINE GENERAL MEDICAL EXAMINATION AT A HEALTH CARE FACILITY: Primary | ICD-10-CM

## 2024-09-26 DIAGNOSIS — Z13.6 SCREENING FOR HEART DISEASE: ICD-10-CM

## 2024-09-26 DIAGNOSIS — Z12.5 PROSTATE CANCER SCREENING: ICD-10-CM

## 2024-09-26 DIAGNOSIS — Z13.220 LIPID SCREENING: ICD-10-CM

## 2024-09-26 DIAGNOSIS — L81.9 PIGMENTED SKIN LESION: ICD-10-CM

## 2024-09-26 DIAGNOSIS — E53.8 VITAMIN B 12 DEFICIENCY: ICD-10-CM

## 2024-09-26 DIAGNOSIS — Z00.00 ROUTINE GENERAL MEDICAL EXAMINATION AT A HEALTH CARE FACILITY: ICD-10-CM

## 2024-09-26 LAB
25(OH)D3 SERPL-MCNC: 38 NG/ML (ref 30–100)
ALBUMIN SERPL BCP-MCNC: 4.3 G/DL (ref 3.4–5)
ALP SERPL-CCNC: 65 U/L (ref 33–120)
ALT SERPL W P-5'-P-CCNC: 21 U/L (ref 10–52)
ANION GAP SERPL CALC-SCNC: 12 MMOL/L (ref 10–20)
APPEARANCE UR: CLEAR
APTT PPP: 34 SECONDS (ref 27–38)
AST SERPL W P-5'-P-CCNC: 16 U/L (ref 9–39)
BASOPHILS # BLD AUTO: 0.07 X10*3/UL (ref 0–0.1)
BASOPHILS NFR BLD AUTO: 2.1 %
BILIRUB SERPL-MCNC: 0.9 MG/DL (ref 0–1.2)
BILIRUB UR STRIP.AUTO-MCNC: NEGATIVE MG/DL
BUN SERPL-MCNC: 12 MG/DL (ref 6–23)
CALCIUM SERPL-MCNC: 9.3 MG/DL (ref 8.6–10.6)
CHLORIDE SERPL-SCNC: 106 MMOL/L (ref 98–107)
CHOLEST SERPL-MCNC: 173 MG/DL (ref 0–199)
CHOLESTEROL/HDL RATIO: 3.6
CO2 SERPL-SCNC: 29 MMOL/L (ref 21–32)
COLOR UR: ABNORMAL
COTININE UR QL SCN: NEGATIVE
CREAT SERPL-MCNC: 1.04 MG/DL (ref 0.5–1.3)
CRP SERPL-MCNC: <0.1 MG/DL
EGFRCR SERPLBLD CKD-EPI 2021: 87 ML/MIN/1.73M*2
EOSINOPHIL # BLD AUTO: 0.17 X10*3/UL (ref 0–0.7)
EOSINOPHIL NFR BLD AUTO: 5 %
ERYTHROCYTE [DISTWIDTH] IN BLOOD BY AUTOMATED COUNT: 12.9 % (ref 11.5–14.5)
EST. AVERAGE GLUCOSE BLD GHB EST-MCNC: 117 MG/DL
GLUCOSE SERPL-MCNC: 102 MG/DL (ref 74–99)
GLUCOSE UR STRIP.AUTO-MCNC: NORMAL MG/DL
HBA1C MFR BLD: 5.7 %
HCT VFR BLD AUTO: 43.3 % (ref 41–52)
HDLC SERPL-MCNC: 48.6 MG/DL
HGB BLD-MCNC: 14 G/DL (ref 13.5–17.5)
IMM GRANULOCYTES # BLD AUTO: 0.01 X10*3/UL (ref 0–0.7)
IMM GRANULOCYTES NFR BLD AUTO: 0.3 % (ref 0–0.9)
INR PPP: 1 (ref 0.9–1.1)
KETONES UR STRIP.AUTO-MCNC: NEGATIVE MG/DL
LDLC SERPL CALC-MCNC: 95 MG/DL
LEUKOCYTE ESTERASE UR QL STRIP.AUTO: ABNORMAL
LYMPHOCYTES # BLD AUTO: 1.15 X10*3/UL (ref 1.2–4.8)
LYMPHOCYTES NFR BLD AUTO: 34 %
MCH RBC QN AUTO: 28.4 PG (ref 26–34)
MCHC RBC AUTO-ENTMCNC: 32.3 G/DL (ref 32–36)
MCV RBC AUTO: 88 FL (ref 80–100)
MONOCYTES # BLD AUTO: 0.29 X10*3/UL (ref 0.1–1)
MONOCYTES NFR BLD AUTO: 8.6 %
MUCOUS THREADS #/AREA URNS AUTO: NORMAL /LPF
NEUTROPHILS # BLD AUTO: 1.69 X10*3/UL (ref 1.2–7.7)
NEUTROPHILS NFR BLD AUTO: 50 %
NITRITE UR QL STRIP.AUTO: NEGATIVE
NON HDL CHOLESTEROL: 124 MG/DL (ref 0–149)
NRBC BLD-RTO: 0 /100 WBCS (ref 0–0)
PH UR STRIP.AUTO: 5.5 [PH]
PLATELET # BLD AUTO: 145 X10*3/UL (ref 150–450)
POTASSIUM SERPL-SCNC: 4.5 MMOL/L (ref 3.5–5.3)
PROT SERPL-MCNC: 6.5 G/DL (ref 6.4–8.2)
PROT UR STRIP.AUTO-MCNC: NEGATIVE MG/DL
PROTHROMBIN TIME: 11.8 SECONDS (ref 9.8–12.8)
PSA SERPL-MCNC: 0.23 NG/ML
RBC # BLD AUTO: 4.93 X10*6/UL (ref 4.5–5.9)
RBC # UR STRIP.AUTO: NEGATIVE /UL
RBC #/AREA URNS AUTO: NORMAL /HPF
SODIUM SERPL-SCNC: 142 MMOL/L (ref 136–145)
SP GR UR STRIP.AUTO: 1.02
SQUAMOUS #/AREA URNS AUTO: NORMAL /HPF
TESTOST SERPL-MCNC: 265 NG/DL (ref 240–1000)
TRIGL SERPL-MCNC: 149 MG/DL (ref 0–149)
TSH SERPL-ACNC: 1.14 MIU/L (ref 0.44–3.98)
UROBILINOGEN UR STRIP.AUTO-MCNC: NORMAL MG/DL
VLDL: 30 MG/DL (ref 0–40)
WBC # BLD AUTO: 3.4 X10*3/UL (ref 4.4–11.3)
WBC #/AREA URNS AUTO: NORMAL /HPF

## 2024-09-26 PROCEDURE — 82172 ASSAY OF APOLIPOPROTEIN: CPT

## 2024-09-26 PROCEDURE — 3008F BODY MASS INDEX DOCD: CPT | Performed by: INTERNAL MEDICINE

## 2024-09-26 PROCEDURE — 85610 PROTHROMBIN TIME: CPT

## 2024-09-26 PROCEDURE — 80061 LIPID PANEL: CPT

## 2024-09-26 PROCEDURE — 85730 THROMBOPLASTIN TIME PARTIAL: CPT

## 2024-09-26 PROCEDURE — 90471 IMMUNIZATION ADMIN: CPT | Performed by: INTERNAL MEDICINE

## 2024-09-26 PROCEDURE — 85025 COMPLETE CBC W/AUTO DIFF WBC: CPT

## 2024-09-26 PROCEDURE — 84403 ASSAY OF TOTAL TESTOSTERONE: CPT

## 2024-09-26 PROCEDURE — 86140 C-REACTIVE PROTEIN: CPT

## 2024-09-26 PROCEDURE — 93000 ELECTROCARDIOGRAM COMPLETE: CPT | Performed by: INTERNAL MEDICINE

## 2024-09-26 PROCEDURE — 36415 COLL VENOUS BLD VENIPUNCTURE: CPT

## 2024-09-26 PROCEDURE — 99396 PREV VISIT EST AGE 40-64: CPT | Performed by: INTERNAL MEDICINE

## 2024-09-26 PROCEDURE — 81001 URINALYSIS AUTO W/SCOPE: CPT

## 2024-09-26 PROCEDURE — 83036 HEMOGLOBIN GLYCOSYLATED A1C: CPT

## 2024-09-26 PROCEDURE — 84443 ASSAY THYROID STIM HORMONE: CPT

## 2024-09-26 PROCEDURE — 82306 VITAMIN D 25 HYDROXY: CPT

## 2024-09-26 PROCEDURE — 82607 VITAMIN B-12: CPT

## 2024-09-26 PROCEDURE — 90656 IIV3 VACC NO PRSV 0.5 ML IM: CPT | Performed by: INTERNAL MEDICINE

## 2024-09-26 PROCEDURE — 84153 ASSAY OF PSA TOTAL: CPT

## 2024-09-26 PROCEDURE — 80053 COMPREHEN METABOLIC PANEL: CPT

## 2024-09-26 RX ORDER — CLOTRIMAZOLE AND BETAMETHASONE DIPROPIONATE 10; .64 MG/G; MG/G
1 CREAM TOPICAL 2 TIMES DAILY
Qty: 15 G | Refills: 1 | Status: SHIPPED | OUTPATIENT
Start: 2024-09-26 | End: 2024-10-11

## 2024-09-26 ASSESSMENT — ENCOUNTER SYMPTOMS
OCCASIONAL FEELINGS OF UNSTEADINESS: 0
LOSS OF SENSATION IN FEET: 0
DEPRESSION: 0

## 2024-09-26 ASSESSMENT — PATIENT HEALTH QUESTIONNAIRE - PHQ9
1. LITTLE INTEREST OR PLEASURE IN DOING THINGS: NOT AT ALL
2. FEELING DOWN, DEPRESSED OR HOPELESS: NOT AT ALL
SUM OF ALL RESPONSES TO PHQ9 QUESTIONS 1 AND 2: 0

## 2024-09-26 NOTE — PROGRESS NOTES
"Subjective   Patient ID: Louie Huynh is a 49 y.o. male who presents for New Patient Visit (Rash inner R groin area, consistent itchy to body/bilateral arm pits/brusing  ).    HPI patient presents to clinic to establish care. He is requesting a routine physical examination.  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  He  is also concerned about chronic rash noticed on his right groin area for the past few years.  He has also noticed pigmented lesion under his arms over the past 2 weeks.  His symptoms are associated with intermittent pruritus.  He is also concerned about some bruising.  He denies any fever, chills, weight loss, night sweats, swelling of legs, palpitation and diaphoresis.  EKG done shows sinus bradycardia at 49 bpm with normal axis normal interval without any ischemic changes.  Past medical history significant for low vitamin D and B12 deficiency, chronic right ankle injury with negative workup and dyslipidemia.  Past surgical history notable for normal colonoscopy 5 years ago done in [place]    Review of Systems   Constitutional: Negative.    HENT: Negative.     Eyes: Negative.    Respiratory: Negative.     Cardiovascular: Negative.    Gastrointestinal: Negative.    Endocrine: Negative.    Genitourinary: Negative.    Musculoskeletal: Negative.    Skin:  Positive for color change and rash.   Allergic/Immunologic: Negative.    Neurological: Negative.    Hematological: Negative.    Psychiatric/Behavioral: Negative.         Objective   /89 (BP Location: Left arm, Patient Position: Sitting)   Pulse 53   Ht 1.727 m (5' 8\")   Wt 84.8 kg (187 lb)   SpO2 97%   BMI 28.43 kg/m²     Physical Exam  Constitutional:       Appearance: Normal appearance. He is normal weight.   HENT:      Right Ear: Tympanic membrane normal.      Left Ear: Tympanic membrane and ear canal normal.      Nose: Nose normal.   Neck:      Vascular: No carotid bruit.   Cardiovascular:      Rate and Rhythm: Normal rate. "   Pulmonary:      Effort: No respiratory distress.      Breath sounds: No stridor. No wheezing.   Abdominal:      Palpations: Abdomen is soft.      Tenderness: There is no abdominal tenderness. There is no guarding or rebound.   Skin:     General: Skin is warm.      Coloration: Skin is not jaundiced.      Findings: Lesion and rash present. No bruising.      Comments: Hyperpigmented lesion noted under axilla and erythema noticed on the right groin area without any blister formation   Neurological:      General: No focal deficit present.      Mental Status: He is alert and oriented to person, place, and time.   Psychiatric:         Mood and Affect: Mood normal.         Assessment/Plan   Assessment & Plan  Routine general medical examination at a health care facility  Patient will be scheduled for routine blood work.  He will be notified about test results and will make further recommendations.  He is advised to return to clinic in 1 month for follow-up visit.  Orders:    Lipid Panel; Future    CBC and Auto Differential; Future    Hemoglobin A1c; Future    Comprehensive metabolic panel; Future    Prostate Spec.Ag,Screen; Future    Urinalysis with Reflex Microscopic; Future    ECG 12 lead (Clinic Performed)    Lipoprotein A (LPA); Future    Flu vaccine, trivalent, preservative free, age 6 months and greater (Fluarix/Fluzone/Flulaval)    Itch  He is advised to consider Benadryl regarding itching       Rash  He is given trial with Lotrisone ointment regarding his chronic right groin rash.  He is also advised to clean the skin with soap and water.  Orders:    TSH; Future    clotrimazole-betamethasone (Lotrisone) cream; Apply 1 Application topically 2 times a day for 15 days.    Vitamin D deficiency  Will check vitamin D level  Orders:    Vitamin D 25-Hydroxy,Total (for eval of Vitamin D levels); Future    Vitamin B 12 deficiency  Check B12 level  Orders:    Vitamin B12; Future    Lipid screening  Will check lipid panel and  LP(a)  Orders:    Lipoprotein A (LPA); Future    Bruising  Will check coagulation studies regarding intermittent bruising   Orders:    C-reactive protein; Future    Testosterone; Future    Protime-INR; Future    APTT; Future    Pigmented skin lesion  He is scheduled to follow-up with dermatology clinic regarding chronic rash and pigmented lesion under his arm.  Orders:    Testosterone; Future    Screening for heart disease  He is advised to obtain CT cardiac scoring regarding screening for heart disease.  Orders:    CT cardiac scoring wo IV contrast; Future    Lipoprotein A (LPA); Future    Prostate cancer screening  To check PSA  Orders:    Prostate Spec.Ag,Screen; Future

## 2024-09-27 LAB — VIT B12 SERPL-MCNC: 481 PG/ML (ref 211–911)

## 2024-09-27 ASSESSMENT — ENCOUNTER SYMPTOMS
ENDOCRINE NEGATIVE: 1
ALLERGIC/IMMUNOLOGIC NEGATIVE: 1
CONSTITUTIONAL NEGATIVE: 1
COLOR CHANGE: 1
CARDIOVASCULAR NEGATIVE: 1
GASTROINTESTINAL NEGATIVE: 1
EYES NEGATIVE: 1
MUSCULOSKELETAL NEGATIVE: 1
NEUROLOGICAL NEGATIVE: 1
HEMATOLOGIC/LYMPHATIC NEGATIVE: 1
RESPIRATORY NEGATIVE: 1
PSYCHIATRIC NEGATIVE: 1

## 2024-09-28 LAB — LPA SERPL-MCNC: 33 MG/DL

## 2024-10-07 ENCOUNTER — APPOINTMENT (OUTPATIENT)
Dept: ORTHOPEDIC SURGERY | Facility: HOSPITAL | Age: 49
End: 2024-10-07
Payer: COMMERCIAL

## 2024-11-05 ENCOUNTER — DOCUMENTATION (OUTPATIENT)
Dept: PHYSICAL THERAPY | Facility: CLINIC | Age: 49
End: 2024-11-05
Payer: COMMERCIAL

## 2024-11-05 NOTE — PROGRESS NOTES
Discharge Summary    Name: Louie Huynh  MRN: [id number]  : 1975  Date: 24    Discharge Summary: PT    Discharge Information: Date of discharge 24, Date of last visit 24, Date of evaluation 3/12/24, Number of attended visits 7, Referred by Mindy August PA-C, and Referred for Moderate R ankle sprain    Therapy Summary: Pt noting slow progress with PT POC    Discharge Status: No formal reassessment performed     Rehab Discharge Reason: Failed to schedule and/or keep follow-up appointment(s) and Other Time off to independently strength d/t busy schedule

## 2024-11-19 ENCOUNTER — APPOINTMENT (OUTPATIENT)
Dept: PRIMARY CARE | Facility: CLINIC | Age: 49
End: 2024-11-19
Payer: COMMERCIAL

## 2024-11-19 VITALS
OXYGEN SATURATION: 97 % | HEART RATE: 61 BPM | HEIGHT: 68 IN | SYSTOLIC BLOOD PRESSURE: 122 MMHG | WEIGHT: 187 LBS | BODY MASS INDEX: 28.34 KG/M2 | DIASTOLIC BLOOD PRESSURE: 84 MMHG

## 2024-11-19 DIAGNOSIS — D69.6 THROMBOCYTOPENIA (CMS-HCC): ICD-10-CM

## 2024-11-19 DIAGNOSIS — E53.8 VITAMIN B 12 DEFICIENCY: Primary | ICD-10-CM

## 2024-11-19 DIAGNOSIS — L29.9 CHRONIC PRURITUS: ICD-10-CM

## 2024-11-19 DIAGNOSIS — L29.9 ITCH: ICD-10-CM

## 2024-11-19 DIAGNOSIS — R73.9 HYPERGLYCEMIA: ICD-10-CM

## 2024-11-19 PROCEDURE — 3008F BODY MASS INDEX DOCD: CPT | Performed by: INTERNAL MEDICINE

## 2024-11-19 PROCEDURE — 99213 OFFICE O/P EST LOW 20 MIN: CPT | Performed by: INTERNAL MEDICINE

## 2024-11-19 ASSESSMENT — PATIENT HEALTH QUESTIONNAIRE - PHQ9
1. LITTLE INTEREST OR PLEASURE IN DOING THINGS: NOT AT ALL
SUM OF ALL RESPONSES TO PHQ9 QUESTIONS 1 AND 2: 0
2. FEELING DOWN, DEPRESSED OR HOPELESS: NOT AT ALL

## 2024-11-19 ASSESSMENT — LIFESTYLE VARIABLES
HOW MANY STANDARD DRINKS CONTAINING ALCOHOL DO YOU HAVE ON A TYPICAL DAY: PATIENT DOES NOT DRINK
SKIP TO QUESTIONS 9-10: 1
HOW OFTEN DO YOU HAVE SIX OR MORE DRINKS ON ONE OCCASION: NEVER
HOW OFTEN DO YOU HAVE A DRINK CONTAINING ALCOHOL: NEVER
AUDIT-C TOTAL SCORE: 0

## 2024-11-19 ASSESSMENT — ENCOUNTER SYMPTOMS
LOSS OF SENSATION IN FEET: 0
DEPRESSION: 0
OCCASIONAL FEELINGS OF UNSTEADINESS: 0

## 2024-11-19 ASSESSMENT — PAIN SCALES - GENERAL: PAINLEVEL_OUTOF10: 0-NO PAIN

## 2024-11-19 NOTE — PROGRESS NOTES
"Subjective   Patient ID: Louie Huynh is a 49 y.o. male who presents for Follow-up and Rash.    Rash  Associated symptoms include eye pain (cmment). Pertinent negatives include no congestion, cough, fatigue or fever.    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  He continues to complain of chronic pruritus.  He was recently seen by Olimpia Woodall  physician assistant in dermatology clinic and was prescribed Cleocin ointment for possible erythrasma.  He has history of vitamin D, B12 deficiency, chronic right ankle injury and dyslipidemia.  He denies any nausea vomiting fever chills tick bite abdominal pain chest pain and shortness of breath.  Laboratory studies done shows platelets of 145, WBC of 3.4, serum glucose of 102, hemoglobin A1c of 5.7% and other studies have been reviewed and discussed with him.     Review of Systems   Constitutional: Negative.  Negative for activity change, appetite change, chills, fatigue and fever.   HENT:  Positive for ear pain. Negative for congestion, drooling and ear discharge.    Eyes:  Positive for pain (cmment).   Respiratory:  Negative for apnea, cough and chest tightness.    Genitourinary:  Negative for difficulty urinating, enuresis, flank pain and hematuria.   Musculoskeletal:  Positive for gait problem, joint swelling and myalgias.   Skin:  Positive for rash.   Hematological: Negative.        Objective   /84 (BP Location: Right arm, Patient Position: Sitting)   Pulse 61   Ht 1.727 m (5' 8\")   Wt 84.8 kg (187 lb)   SpO2 97%   BMI 28.43 kg/m²     Physical Exam  Constitutional:       Appearance: Normal appearance. He is normal weight.   HENT:      Right Ear: Tympanic membrane normal.      Left Ear: Tympanic membrane and ear canal normal.      Nose: Nose normal.   Neck:      Vascular: No carotid bruit.   Cardiovascular:      Rate and Rhythm: Normal rate.   Pulmonary:      Effort: No respiratory distress.      Breath sounds: No stridor. No wheezing. "   Abdominal:      Palpations: Abdomen is soft.      Tenderness: There is no abdominal tenderness. There is no guarding or rebound.   Skin:     Coloration: Skin is not jaundiced.      Findings: No bruising.   Neurological:      General: No focal deficit present.      Mental Status: He is alert and oriented to person, place, and time.   Psychiatric:         Mood and Affect: Mood normal.         Assessment/Plan   Assessment & Plan  Vitamin B 12 deficiency  patient will continue vitamin B12 OTC regarding B12 deficiency.  He will return to clinic annually unless he has any change in his symptoms.       Itch  He will be  referred to allergy clinic regarding chronic itching  Orders:    Referral to Allergy; Future    Chronic pruritus  He is advised to try over-the-counter Zyrtec and Pepcid regarding chronic pruritus.       Hyperglycemia  We will monitor serum glucose and hemoglobin A1c.       Thrombocytopenia ([facility])  We will continue to monitor platelet counts.

## 2024-11-23 ASSESSMENT — ENCOUNTER SYMPTOMS
MYALGIAS: 1
CHILLS: 0
JOINT SWELLING: 1
APPETITE CHANGE: 0
EYE PAIN: 1
HEMATOLOGIC/LYMPHATIC NEGATIVE: 1
FLANK PAIN: 0
FATIGUE: 0
APNEA: 0
ACTIVITY CHANGE: 0
CHEST TIGHTNESS: 0
DIFFICULTY URINATING: 0
COUGH: 0
HEMATURIA: 0
FEVER: 0
CONSTITUTIONAL NEGATIVE: 1

## 2024-12-23 ENCOUNTER — TELEPHONE (OUTPATIENT)
Dept: ORTHOPEDIC SURGERY | Facility: HOSPITAL | Age: 49
End: 2024-12-23
Payer: COMMERCIAL

## 2024-12-23 NOTE — TELEPHONE ENCOUNTER
Called patient to let him know that Dr. Espinoza doesn't see feet. Patient didn't answer so I left a voicemail letting him know this with the number to central scheduling at [phone] with the names of Toney Schmitt and Everardo.  Also left my call back number of [phone] in case he had any questions. CT

## 2025-01-08 ENCOUNTER — TELEPHONE (OUTPATIENT)
Dept: ORTHOPEDIC SURGERY | Facility: HOSPITAL | Age: 50
End: 2025-01-08
Payer: COMMERCIAL

## 2025-01-08 ENCOUNTER — APPOINTMENT (OUTPATIENT)
Dept: ORTHOPEDIC SURGERY | Facility: HOSPITAL | Age: 50
End: 2025-01-08
Payer: COMMERCIAL

## 2025-01-08 NOTE — TELEPHONE ENCOUNTER
Patient was called again to let him know that Dr. Espinoza doesn't see feet. Patient was successfully rescheduled to see Dr. Mcgee on Tuesday 1/14/25 at 11:45 am at [facility]. CT

## 2025-01-14 ENCOUNTER — OFFICE VISIT (OUTPATIENT)
Dept: ORTHOPEDIC SURGERY | Facility: CLINIC | Age: 50
End: 2025-01-14
Payer: COMMERCIAL

## 2025-01-14 ENCOUNTER — HOSPITAL ENCOUNTER (OUTPATIENT)
Dept: RADIOLOGY | Facility: CLINIC | Age: 50
Discharge: HOME | End: 2025-01-14
Payer: COMMERCIAL

## 2025-01-14 DIAGNOSIS — M21.41 ACQUIRED PES PLANUS OF BOTH FEET: ICD-10-CM

## 2025-01-14 DIAGNOSIS — M21.42 ACQUIRED PES PLANUS OF BOTH FEET: ICD-10-CM

## 2025-01-14 DIAGNOSIS — M95.8 OSTEOCHONDRAL DEFECT OF ANKLE: ICD-10-CM

## 2025-01-14 DIAGNOSIS — M25.871 IMPINGEMENT OF RIGHT ANKLE JOINT: Primary | ICD-10-CM

## 2025-01-14 DIAGNOSIS — M25.571 RIGHT ANKLE PAIN, UNSPECIFIED CHRONICITY: ICD-10-CM

## 2025-01-14 PROCEDURE — 73610 X-RAY EXAM OF ANKLE: CPT | Mod: RIGHT SIDE | Performed by: RADIOLOGY

## 2025-01-14 PROCEDURE — 99213 OFFICE O/P EST LOW 20 MIN: CPT | Performed by: STUDENT IN AN ORGANIZED HEALTH CARE EDUCATION/TRAINING PROGRAM

## 2025-01-14 PROCEDURE — 73610 X-RAY EXAM OF ANKLE: CPT | Mod: RT

## 2025-01-14 ASSESSMENT — PAIN - FUNCTIONAL ASSESSMENT: PAIN_FUNCTIONAL_ASSESSMENT: 0-10

## 2025-01-14 ASSESSMENT — PAIN SCALES - GENERAL: PAINLEVEL_OUTOF10: 2

## 2025-01-14 NOTE — PROGRESS NOTES
ORTHOPAEDIC SURGERY HISTORY & PHYSICAL     Chief Complaint:  Right ankle pain    History Of Present Illness  Louie Huynh is a 50 y.o. male who presents for evaluation of right ankle pain.  Patient has a history of ankle pain dating back to the summer 2023 when he sustained an inversion injury to his ankle while playing basketball.  He underwent an extensive course of physical therapy and ultimately obtained an MRI with concern for OLT. Patient has had persistent pain and swelling. This has never completely resolved and is limiting function and basketball play. He describes a sensation that the ankle is always sprained.  He is able to continue play.  He has not been using any brace, orthotics or incision issues.  He has had formal PT in the past.  He denies any new trauma.     Past Medical History  Past Medical History:   Diagnosis Date    Vitamin D deficiency        Surgical History  Recent Surgeries in Orthopaedic Surgery            No cases to display             Social History  Social History     Socioeconomic History    Marital status:     Number of children: 3    Years of education: 21    Highest education level: Professional school degree (e.g., MD, DDS, DVM, [employer])   Occupational History    Occupation: works as a oncologist   Tobacco Use    Smoking status: Never    Smokeless tobacco: Never   Vaping Use    Vaping status: Never Used   Substance and Sexual Activity    Alcohol use: Never    Drug use: Never    Sexual activity: Yes     Partners: Female       Family History  Family History   Problem Relation Name Age of Onset    Hypertension Mother      Other (type 2 dm) Father      Hypertension Father          Allergies  No Known Allergies    Review of Systems  REVIEW OF SYSTEMS  Constitutional: no unplanned weight loss  Psychiatric: no suicidal ideation  ENT: no vision changes, no sinus problems  Pulmonary: no shortness of breath  Lymphatic: no enlarged lymph nodes  Cardiovascular: no chest pain or shortness  of breath  Gastrointestinal: no stomach problems  Genitourinary: no dysuria   Skin: no rashes  Endocrine: no thyroid problems  Neurological: no headache, no numbness  Hematological: no easy bruising  Musculoskeletal: Right ankle pain    Physical Exam  PHYSICAL EXAMINATION  Constitutional Exam: well developed and well nourished  Psychiatric Exam: alert and oriented, appropriate mood and behavior  Eye Exam: EOMI  Pulmonary Exam: breathing non-labored, no apparent distress  Lymphatic exam: no appreciable lymphadenopathy in the lower extremities  Cardiovascular exam: RRR to peripheral palpation, DP pulses 2+, PT 2+, toes are pink with good capillary refill, no pitting edema  Skin exam: no open lesions, rashes, abrasions or ulcerations  Neurological exam: sensation to light touch intact in both lower extremities in peripheral and dermatomal distributions (except for any abnormalities noted in musculoskeletal exam)    Musculoskeletal exam: Right lower extremity semination.  Patient pain localized to the lateral ankle and medial talar shoulder.  Patient is focally tender to palpation both of these regions without obvious ankle effusion.  Positive anterolateral impingement test.  Nontender to palpation at the PTT.  Patient has greater than physiologic hindfoot valgus with pes planus arch posture and no significant forefoot deformity noted.  Patient has relatively pain-free ankle, subtalar and midtarsal joint range of motion without crepitus noted.  Patient has sensation grossly intact to light touch in a saphenous, sural, superficial peroneal, deep peroneal and tibial nerve distribution.  Patient has intact PF/DF/EHL and 2+ DP/PT pulses palpated.  Patient is able to perform an assisted double leg heel raise and partially assisted heel raise on the right.    Last Recorded Vitals  There were no vitals taken for this visit.    Laboratory Results    No results found for this or any previous visit (from the past 24  hours).    Radiology Results   X-ray imaging 3 view weightbearing right ankle reviewed and independently evaluated by me on 01/14/2025 demonstrates well aligned ankle mortise with medial talar shoulder lucency likely consistent with OLT.  Incompletely visualized forefoot suggest pes planus posture, there is note of increased anterior distal tibial OP as well as medial gutter, well-corticated fragments, possibly remote injury associated.    Assessment/Plan:  50-year-old male who patient has right ankle pain likely secondary to ankle impingement in setting of OLT and bilateral pes planus posture.  I have reviewed the diagnosis and treatment options extensively with the patient.  I would recommend the patient continue weightbearing to his tolerance in his right lower extremity.  The patient has obviously failed exhaustive and appropriate course of nonoperative treatment with persistent pain.  I will obtain an MRI of his right ankle to more completely evaluate his ankle joint.  I will plan to contact the patient with the results of the study and discuss next treatment steps.  The patient has had ongoing left shoulder pain I will provide him with a referral to one of my partners for more complete evaluation of assumed left biceps tendinopathy.    Tushar Mcgee MD, ALDA  Department of Orthopaedic Surgery  [facility]    The diagnosis and treatment plan were reviewed with the patient. All questions were answered. The patient verbalized understanding of the treatment plan. There were no barriers to understanding identified.    Note dictated with [employer] software.  Completed without full type editing and sent to avoid delay.

## 2025-01-15 ENCOUNTER — HOSPITAL ENCOUNTER (OUTPATIENT)
Dept: RADIOLOGY | Facility: HOSPITAL | Age: 50
Discharge: HOME | End: 2025-01-15
Payer: COMMERCIAL

## 2025-01-15 ENCOUNTER — OFFICE VISIT (OUTPATIENT)
Dept: ORTHOPEDIC SURGERY | Facility: HOSPITAL | Age: 50
End: 2025-01-15
Payer: COMMERCIAL

## 2025-01-15 VITALS — WEIGHT: 180 LBS | HEIGHT: 68 IN | BODY MASS INDEX: 27.28 KG/M2

## 2025-01-15 DIAGNOSIS — M25.512 ACUTE PAIN OF LEFT SHOULDER: ICD-10-CM

## 2025-01-15 DIAGNOSIS — M75.82 ROTATOR CUFF TENDONITIS, LEFT: Primary | ICD-10-CM

## 2025-01-15 DIAGNOSIS — M75.22 BICIPITAL TENDONITIS OF SHOULDER, LEFT: ICD-10-CM

## 2025-01-15 PROCEDURE — 99214 OFFICE O/P EST MOD 30 MIN: CPT | Mod: 25 | Performed by: PHYSICIAN ASSISTANT

## 2025-01-15 PROCEDURE — 20610 DRAIN/INJ JOINT/BURSA W/O US: CPT | Mod: LT | Performed by: PHYSICIAN ASSISTANT

## 2025-01-15 PROCEDURE — 3008F BODY MASS INDEX DOCD: CPT | Performed by: PHYSICIAN ASSISTANT

## 2025-01-15 PROCEDURE — 73030 X-RAY EXAM OF SHOULDER: CPT | Mod: LEFT SIDE | Performed by: RADIOLOGY

## 2025-01-15 PROCEDURE — 99214 OFFICE O/P EST MOD 30 MIN: CPT | Performed by: PHYSICIAN ASSISTANT

## 2025-01-15 PROCEDURE — 2500000004 HC RX 250 GENERAL PHARMACY W/ HCPCS (ALT 636 FOR OP/ED): Performed by: PHYSICIAN ASSISTANT

## 2025-01-15 PROCEDURE — 1036F TOBACCO NON-USER: CPT | Performed by: PHYSICIAN ASSISTANT

## 2025-01-15 PROCEDURE — 73030 X-RAY EXAM OF SHOULDER: CPT | Mod: LT

## 2025-01-15 RX ORDER — TRIAMCINOLONE ACETONIDE 40 MG/ML
40 INJECTION, SUSPENSION INTRA-ARTICULAR; INTRAMUSCULAR
Status: COMPLETED | OUTPATIENT
Start: 2025-01-15 | End: 2025-01-15

## 2025-01-15 RX ORDER — TACROLIMUS 1 MG/G
OINTMENT TOPICAL
COMMUNITY
Start: 2025-01-01

## 2025-01-15 RX ORDER — DESLORATADINE 5 MG/1
TABLET ORAL
COMMUNITY
Start: 2024-12-19

## 2025-01-15 RX ORDER — TRIAMCINOLONE ACETONIDE 0.25 MG/G
CREAM TOPICAL
COMMUNITY
Start: 2024-12-19

## 2025-01-15 RX ADMIN — TRIAMCINOLONE ACETONIDE 40 MG: 400 INJECTION, SUSPENSION INTRA-ARTICULAR; INTRAMUSCULAR at 10:30

## 2025-01-15 ASSESSMENT — PAIN SCALES - GENERAL: PAINLEVEL_OUTOF10: 1

## 2025-01-15 NOTE — PROGRESS NOTES
Subjective    Patient ID: Louie Huynh is a 50 y.o. male.    Chief Complaint: Pain of the Left Shoulder           HPI:  Louie Huynh is a 50 y.o. male who presents today for evaluation of his left shoulder.  He states that for the last several weeks he has been experiencing sharp pain in his left shoulder.  The majority of symptoms seem to be laterally with some anterior going into his biceps.  He feels that his symptoms are related to his workouts.  He indicates that he has been doing a little bit more body weight exercise and including dips.  He notes weakness when attempting to work out.  He has not noticed any obvious deformity of his left biceps.  He does have a history of a right clavicle fracture that occurred 20 years ago.  Surgery was not performed at that time and he does indicate that he has weakness in his right shoulder which he attributes to his prior clavicle fracture.    ROS  Constitutional: No fever, no chills, not feeling tired, no recent weight gain and no recent weight loss  ENT: No nosebleeds  Cardiovascular: No chest pain  Respiratory: No shortness of breath and no cough  Gastrointestinal: No abdominal pain, no nausea, no diarrhea, and no vomiting  Musculoskeletal: No arthralgias  Integumentary: No rashes and no skin lesions  Neurological: No headache  Psychiatric: No sleep disturbances no depression  Endocrine: No muscle weakness and no muscle cramps  Hematologic/lymphatic: No swelling glands and no tendency for easy bruising    Past Medical History:   Diagnosis Date    Vitamin D deficiency         History reviewed. No pertinent surgical history.       Current Outpatient Medications:     desloratadine (Clarinex) 5 mg tablet, Take one tablet at bedtime. If no improvement after 1-2 weeks, can take 2 tablets at bedtime., Disp: , Rfl:     tacrolimus (Protopic) 0.1 % ointment, Apply twice daily to the affected areas on groin and armpits for 3 weeks a month. Alternate with triamcinolone., Disp: , Rfl:      triamcinolone (Kenalog) 0.025 % cream, Apply twice daily to the affected areas on groin and armpits for 1 week a month. Alternate with pimecrolimus, Disp: , Rfl:      No Known Allergies     Social Connections: Not on file          Objective   50-year-old male well appearing in no acute distress. Alert and oriented ×3.  Skin intact bilateral upper extremities.   Normal tandem gait. Coordination and balance intact.  Bilateral upper extremity compartments supple.  5 out of 5 distal motor strength bilaterally.  C2 through C7 sensation intact bilaterally.  2+ distal pulses bilaterally.  The right shoulder does sit slightly forward and is shortened when compared to the left.  There is a deformity in the midportion of the right clavicle.  He has full and symmetric forward flexion abduction of the  left shoulder when compared to the right.  Negative empty can bilaterally.  5 out of 5 strength with internal and external rotation bilaterally.  Equivalent internal rotation bilaterally.  He has mild tenderness over the left AC joint and proximal biceps tendon.  Some pain with Neer and Holliday impingement test.    Image Results:  X-rays the left shoulder taken today in the office were reviewed.  These were negative for fracture or dislocation.  No significant arthritic changes noted.    L Inj/Asp: L subacromial bursa on 1/15/2025 10:30 AM  Indications: pain  Details: 22 G needle, posterior approach  Medications: 40 mg triamcinolone acetonide 40 mg/mL      Subacromial Injection:    Left subacromial bursa injection offered for therapeutic purposes. Indication: pain and subacromial bursitis. Risks and benefits were discussed. After questions were answered the patient provided consent to proceed. The patient was injected from the posterior position. Under sterile conditions, the subacromial bursa was injected with 40 mg Kenalog and 4 cc lidocaine without complication. The injection was tolerated well. Post injection instructions  provided.      Assessment/Plan   Encounter Diagnoses:  Rotator cuff tendonitis, left    Acute pain of left shoulder    Bicipital tendonitis of shoulder, left    Orders Placed This Encounter    XR shoulder left 2+ views    Referral to Physical Therapy       1. The patient and I discussed the findings of rotator cuff tendinitis and subacromial bursitis.  The differential diagnosis includes a rotator cuff tear and referred pain from the cervical spine.  He may have some early degenerative changes at the AC joint or osteolysis of the distal clavicle  2. Activity restrictions were outlined with avoidance of heavy overhead lifting and safe gym exercises.  Specifically we outlined to avoid dips.  3. Physical therapy is prescribed for a rotator cuff strengthening program, capsular stretches, modalities and periscapular strength.  We discussed the importance of performing his exercises at home as well.  4. All the patient's questions were answered.  I will see the patient back if symptoms persist.  5. If symptoms persist an MRI will be ordered.  6. A subacromial injection injection was performed for diagnostic and therapeutic purposes today.    This office note was dictated using Dragon voice to text software and was not proofread for spelling or grammatical errors

## 2025-01-19 ENCOUNTER — APPOINTMENT (OUTPATIENT)
Dept: RADIOLOGY | Facility: HOSPITAL | Age: 50
End: 2025-01-19
Payer: COMMERCIAL

## 2025-01-22 ENCOUNTER — TELEPHONE (OUTPATIENT)
Dept: ORTHOPEDIC SURGERY | Facility: HOSPITAL | Age: 50
End: 2025-01-22
Payer: COMMERCIAL

## 2025-01-22 ENCOUNTER — HOSPITAL ENCOUNTER (OUTPATIENT)
Dept: RADIOLOGY | Facility: HOSPITAL | Age: 50
Discharge: HOME | End: 2025-01-22
Payer: COMMERCIAL

## 2025-01-22 DIAGNOSIS — M95.8 OSTEOCHONDRAL DEFECT OF ANKLE: Primary | ICD-10-CM

## 2025-01-22 DIAGNOSIS — M95.8 OSTEOCHONDRAL DEFECT OF ANKLE: ICD-10-CM

## 2025-01-22 PROCEDURE — 73721 MRI JNT OF LWR EXTRE W/O DYE: CPT | Mod: RT

## 2025-01-22 PROCEDURE — 73721 MRI JNT OF LWR EXTRE W/O DYE: CPT | Mod: RIGHT SIDE | Performed by: RADIOLOGY

## 2025-01-23 NOTE — TELEPHONE ENCOUNTER
I attempted to contact the patient via the number listed in the chart as his MRI results have been made available.      The patient's voicemail box does not identify him by name and so I was unable to leave a message.  I will try the patient again at a later time.    Tushar Mcgee MD, ALDA  Department of Orthopaedic Surgery  [facility]

## 2025-01-25 ENCOUNTER — TELEPHONE (OUTPATIENT)
Dept: ORTHOPEDIC SURGERY | Facility: HOSPITAL | Age: 50
End: 2025-01-25
Payer: COMMERCIAL

## 2025-01-25 NOTE — TELEPHONE ENCOUNTER
I contacted the patient via the number listed in the chart to discuss MRI results.  He has had persistent pain and swelling for well over a year that has not responded well to physical therapy.  Given his MRI findings of medial and lateral osteochondral lesions without tibial sided involvement, consideration for a right ankle arthroscopy with treatment of his OLT would be very reasonable at this point.  I will coordinate with my team regarding next treatment steps and scheduling.      Tushar Mcgee MD, ALDA  Department of Orthopaedic Surgery  [facility]

## 2025-02-17 ENCOUNTER — HOSPITAL ENCOUNTER (OUTPATIENT)
Facility: HOSPITAL | Age: 50
Setting detail: OUTPATIENT SURGERY
End: 2025-02-17
Attending: STUDENT IN AN ORGANIZED HEALTH CARE EDUCATION/TRAINING PROGRAM | Admitting: STUDENT IN AN ORGANIZED HEALTH CARE EDUCATION/TRAINING PROGRAM
Payer: COMMERCIAL

## 2025-02-17 DIAGNOSIS — M95.8 OSTEOCHONDRAL DEFECT OF ANKLE: ICD-10-CM

## 2025-02-17 DIAGNOSIS — M25.871 IMPINGEMENT OF RIGHT ANKLE JOINT: ICD-10-CM

## 2025-02-18 ENCOUNTER — PREP FOR PROCEDURE (OUTPATIENT)
Dept: ORTHOPEDIC SURGERY | Facility: HOSPITAL | Age: 50
End: 2025-02-18
Payer: COMMERCIAL

## 2025-02-18 DIAGNOSIS — R05.3 CHRONIC COUGH: Primary | ICD-10-CM

## 2025-02-18 DIAGNOSIS — R91.1 LUNG NODULE: ICD-10-CM

## 2025-02-18 RX ORDER — ALBUTEROL SULFATE 90 UG/1
2 INHALANT RESPIRATORY (INHALATION) EVERY 6 HOURS PRN
Qty: 8.5 G | Refills: 3 | Status: SHIPPED | OUTPATIENT
Start: 2025-02-18 | End: 2026-02-18

## 2025-02-20 ENCOUNTER — CLINICAL SUPPORT (OUTPATIENT)
Dept: PREADMISSION TESTING | Facility: HOSPITAL | Age: 50
End: 2025-02-20
Payer: COMMERCIAL

## 2025-02-20 VITALS — WEIGHT: 180 LBS | BODY MASS INDEX: 27.28 KG/M2 | HEIGHT: 68 IN

## 2025-02-20 DIAGNOSIS — M25.871 IMPINGEMENT OF RIGHT ANKLE JOINT: ICD-10-CM

## 2025-02-20 DIAGNOSIS — M95.8 OSTEOCHONDRAL DEFECT OF ANKLE: ICD-10-CM

## 2025-02-20 ASSESSMENT — ENCOUNTER SYMPTOMS
WEAKNESS: 0
ABDOMINAL PAIN: 0
DYSPNEA AT REST: 0
WOUND: 0
DYSPNEA WITH EXERTION: 0
SHORTNESS OF BREATH: 0
PND: 0
CHILLS: 0
FEVER: 0

## 2025-02-20 NOTE — CPM/PAT NURSE NOTE
CPM/PAT Nurse Note      Name: Louie Huynh (Louie Huynh)  /Age: 1975/50 y.o.       Past Medical History:   Diagnosis Date    Vitamin D deficiency        Past Surgical History:   Procedure Laterality Date    COLONOSCOPY         Patient  reports being sexually active and has had partner(s) who are female.    Family History   Problem Relation Name Age of Onset    Hypertension Mother      Other (type 2 dm) Father      Hypertension Father         No Known Allergies    Prior to Admission medications    Medication Sig Start Date End Date Taking? Authorizing Provider   albuterol (ProAir HFA) 90 mcg/actuation inhaler Inhale 2 puffs every 6 hours if needed for wheezing or shortness of breath. 25 Yes Benjamin Swanson MD   tacrolimus (Protopic) 0.1 % ointment Apply twice daily to the affected areas on groin and armpits for 3 weeks a month. Alternate with triamcinolone. 25  Yes Historical Provider, MD   triamcinolone (Kenalog) 0.025 % cream Apply twice daily to the affected areas on groin and armpits for 1 week a month. Alternate with pimecrolimus 24  Yes Historical Provider, MD   desloratadine (Clarinex) 5 mg tablet once daily as needed. 24   Historical Provider, MD MORA ROS:   Constitutional:    no fever   no chills  Neuro/Psych:    no weakness  Eyes:   Ears:    no hearing aides  Nose:   Mouth:    no dental issues  Throat:    no throat pain  Neck:   Cardio:    no chest pain   no dyspnea   no COSTA   no paroxysmal nocturnal dyspnea  Respiratory:    no shortness of breath  Endocrine:   GI:    no abdominal pain  :   Musculoskeletal:   Hematologic:    no history of blood transfusion   no blood clots  Skin:   no sores/wound   no rash      DASI Risk Score    No data to display       Caprini DVT Assessment    No data to display       Modified Frailty Index    No data to display       CHADS2 Stroke Risk  Current as of 44 minutes ago        N/A 3 to 100%: High Risk   2 to < 3%: Medium Risk   0 to  < 2%: Low Risk     Last Change: N/A          This score determines the patient's risk of having a stroke if the patient has atrial fibrillation.        This score is not applicable to this patient. Components are not calculated.          Revised Cardiac Risk Index    No data to display       Apfel Simplified Score    No data to display       Risk Analysis Index Results This Encounter    No data found in the last 10 encounters.       Stop Bang Score      Flowsheet Row Clinical Support from 2/20/2025 in [facility]   Do you snore loudly? 0 filed at 02/20/2025 0750   Do you often feel tired or fatigued after your sleep? 0 filed at 02/20/2025 0750   Has anyone ever observed you stop breathing in your sleep? 0 filed at 02/20/2025 0750   Do you have or are you being treated for high blood pressure? 0 filed at 02/20/2025 0750   Recent BMI (Calculated) 27.4 filed at 02/20/2025 0750   Is BMI greater than 35 kg/m2? 0=No filed at 02/20/2025 0750   Age older than 50 years old? 1=Yes filed at 02/20/2025 0750   Is your neck circumference greater than 17 inches (Male) or 16 inches (Female)? 0 filed at 02/20/2025 0750   Gender - Male 1=Yes filed at 02/20/2025 0750   STOP-BANG Total Score 2 filed at 02/20/2025 0750          Prodigy: High Risk  Total Score: 8              Prodigy Gender Score          ARISCAT Score for Postoperative Pulmonary Complications    No data to display       Figueroa Perioperative Risk for Myocardial Infarction or Cardiac Arrest (CARMEN)    No data to display         Nurse Plan of Action: Spoke with patient and completed RN PAT screening call. Discussed preoperative surgical instructions and education. Chart updated per information provided by patient. Patient had no further questions at this time. In person PAT appointment not requested for patient per reported patient history.

## 2025-02-20 NOTE — PREPROCEDURE INSTRUCTIONS
Current Medications   Medication Instructions    albuterol (ProAir HFA) 90 mcg/actuation inhaler Take as needed    tacrolimus (Protopic) 0.1 % ointment Hold day of surgery    triamcinolone (Kenalog) 0.025 % cream Hold day of surgery                       NPO Instructions:    Do not eat any food after midnight the night before your surgery/procedure.    Additional Instructions:     Seven/Six Days before Surgery:  Review your medication instructions, stop indicated medications  Five Days before Surgery:  Review your medication instructions, stop indicated medications  Three Days before Surgery:  Review your medication instructions, stop indicated medications  The Day before Surgery:  Review your medication instructions, stop indicated medications  You will be contacted regarding the time of your arrival to facility and surgery time  Do not eat any food after Midnight  Day of Surgery:  Review your medication instructions, take indicated medications  Wear  comfortable loose fitting clothing  Do not use moisturizers, creams, lotions or perfume  All jewelry and valuables should be left at home  PAT PRE-OPERATIVE INSTRUCTIONS    [facility]  [address] [facility] [place] [address]  [phone]    Please let your surgeon know if:      You develop:  Open sores, shingles, burning or painful urination as these may increase your risk of an infection.   Fever=100.4 or greater   New or worsening cold or flu symptoms ( cough, shortness of breath, sore throat, respiratory distress, headache, fatigue, GI symptoms)   You no longer wish to have the surgery.   Any other personal circumstances change that may lead to the need to cancel or defer this surgery-such as being sick or getting admitted to any hospital within one week of your planned procedure.    Your contact details change, such as a change of address or phone number.    Starting now:     Please DO NOT drink alcohol or smoke for 24 hours  before surgery. It is well known that quitting smoking can make a huge difference to your health and recovery from surgery. The longer you abstain from smoking, the better your chances of a healthy recovery. If you need help with quitting, call 6-800QUIT-NOW to be connected to a trained counselor who will discuss the best methods to help you quit.     Before your surgery:    Please stop all supplements/ vitamins 7 days prior to surgery (or as directed by your surgeon).   Please stop taking NSAID pain medicine such as Advil, Ibuprofen, and Motrin 7 days before surgery.    If you develop any fever, cough, cold, rashes, cuts, scratches, scrapes, urinary symptoms or infection anywhere on your body (including teeth and gums) prior to surgery, please call your surgeon’s office as soon as possible. This may require treatment to reduce the chance of cancellation on the day of surgery.    The day before your surgery:   DIET- Do not eat any food after MIDNIGHT.   Get a good night’s rest.  Use the special soap for bathing if you have been instructed to use one.    Scheduled surgery times may change and you will be notified if this occurs - please check your personal voicemail for any updates.     On the morning of surgery:   Wear comfortable, loose fitting clothes which open in the front.   Shower and please do not wear moisturizers, creams, lotions, deodorants, makeup or perfume.    Please bring with you to surgery:   Photo ID and insurance card   Current list of medicines and allergies   Pacemaker/ Defibrillator/Heart stent cards as well as remote controls for implanted devices    CPAP machine and mask    Slings/ splints/ crutches   A copy of your complete advanced directive/DHPOA.    Please do NOT bring with you to surgery:   All jewelry and valuables should be left at home.   Prosthetic devices such as contact lenses, glasses, hearing aids, dentures, eyelash extensions, hairpins and body piercings must be removed prior to  going in to the surgical suite. If you have a case for these items, please bring it with you on surgery day.    *Patients under the age of 18: A responsible adult must be present and remaining in the building throughout the surgical visit.    After outpatient surgery:   A responsible adult MUST accompany you at the time of discharge and stay with you for 24 hours after your surgery. You may NOT drive yourself home after surgery.    Do not drive, operate machinery, make critical decisions or do activities that require co-ordination or balance until after a night’s sleep.   Do not drink alcoholic beverages for 24 hours.   Instructions for resuming your medications will be provided by your surgeon.    CALL YOUR DOCTOR AFTER SURGERY IF YOU HAVE:     Chills and/or a fever of 101° F or higher.    Redness, swelling, pus or drainage from your surgical wound or a bad smell from the wound.    Lightheadedness, fainting or confusion.    Persistent vomiting (throwing up) and are not able to eat or drink for 12 hours.    Three or more loose, watery bowel movements in 24 hours (diarrhea).   Difficulty or pain while urinating( after non-urological surgery)    Pain and swelling in your legs, especially if it is only on one side.    Difficulty breathing or are breathing faster than normal.    Any new concerning symptoms.      Reviewed pre-op instructions with patient, states understanding and denies further questions at this time.      Take Care

## 2025-02-21 ENCOUNTER — HOSPITAL ENCOUNTER (OUTPATIENT)
Dept: RADIOLOGY | Facility: CLINIC | Age: 50
Discharge: HOME | End: 2025-02-21
Payer: COMMERCIAL

## 2025-02-21 DIAGNOSIS — R05.3 CHRONIC COUGH: ICD-10-CM

## 2025-02-21 PROCEDURE — 71046 X-RAY EXAM CHEST 2 VIEWS: CPT

## 2025-02-25 ENCOUNTER — TELEPHONE (OUTPATIENT)
Dept: ORTHOPEDIC SURGERY | Facility: HOSPITAL | Age: 50
End: 2025-02-25
Payer: COMMERCIAL

## 2025-02-25 ENCOUNTER — HOSPITAL ENCOUNTER (OUTPATIENT)
Dept: RADIOLOGY | Facility: HOSPITAL | Age: 50
Discharge: HOME | End: 2025-02-25
Payer: COMMERCIAL

## 2025-02-25 DIAGNOSIS — R91.1 LUNG NODULE: ICD-10-CM

## 2025-02-25 PROBLEM — M89.9 OSTEOCHONDRAL TALAR DOME LESION: Status: ACTIVE | Noted: 2023-12-14

## 2025-02-25 PROBLEM — M25.571 CHRONIC PAIN OF RIGHT ANKLE: Status: RESOLVED | Noted: 2023-12-14 | Resolved: 2025-02-25

## 2025-02-25 PROBLEM — G89.29 CHRONIC PAIN OF RIGHT ANKLE: Status: RESOLVED | Noted: 2023-12-14 | Resolved: 2025-02-25

## 2025-02-25 PROBLEM — M94.9 OSTEOCHONDRAL TALAR DOME LESION: Status: ACTIVE | Noted: 2023-12-14

## 2025-02-25 PROBLEM — S99.911A INJURY OF RIGHT ANKLE: Status: ACTIVE | Noted: 2023-12-14

## 2025-02-25 PROBLEM — M25.371 INSTABILITY OF RIGHT ANKLE JOINT: Status: ACTIVE | Noted: 2023-12-14

## 2025-02-25 PROCEDURE — 71250 CT THORAX DX C-: CPT

## 2025-02-25 NOTE — TELEPHONE ENCOUNTER
I contacted the patient via the number listed in the chart as per MyChart encounter.  He has been experiencing overall improving pain in the ankle.  He notes that he has not quite tested the ankle and is having second thoughts regarding the surgery and potential recovery.  He is going to take some time to consider his options as well as increase his activity.  He will keep me apprised of his progress.    Tushar Mcgee MD, ALDA  Department of Orthopaedic Surgery  [facility]

## 2025-02-27 DIAGNOSIS — J98.59 MEDIASTINAL MASS: Primary | ICD-10-CM

## 2025-02-28 LAB
ALBUMIN SERPL-MCNC: 4.6 G/DL (ref 3.6–5.1)
ALBUMIN SERPL-MCNC: 4.6 G/DL (ref 3.6–5.1)
ALBUMIN/GLOB SERPL: 1.9 (CALC) (ref 1–2.5)
ALP SERPL-CCNC: 71 U/L (ref 35–144)
ALP SERPL-CCNC: 71 U/L (ref 35–144)
ALT SERPL-CCNC: 16 U/L (ref 9–46)
ALT SERPL-CCNC: 16 U/L (ref 9–46)
ANION GAP SERPL CALCULATED.4IONS-SCNC: 10 MMOL/L (CALC) (ref 7–17)
AST SERPL-CCNC: 15 U/L (ref 10–35)
AST SERPL-CCNC: 15 U/L (ref 10–35)
BASOPHILS # BLD AUTO: 49 CELLS/UL (ref 0–200)
BASOPHILS NFR BLD AUTO: 1.4 %
BILIRUB DIRECT SERPL-MCNC: 0.2 MG/DL
BILIRUB INDIRECT SERPL-MCNC: 0.9 MG/DL (CALC) (ref 0.2–1.2)
BILIRUB SERPL-MCNC: 1.1 MG/DL (ref 0.2–1.2)
BILIRUB SERPL-MCNC: 1.1 MG/DL (ref 0.2–1.2)
BUN SERPL-MCNC: 13 MG/DL (ref 7–25)
CALCIUM SERPL-MCNC: 9.4 MG/DL (ref 8.6–10.3)
CHLORIDE SERPL-SCNC: 106 MMOL/L (ref 98–110)
CO2 SERPL-SCNC: 25 MMOL/L (ref 20–32)
CREAT SERPL-MCNC: 0.93 MG/DL (ref 0.7–1.3)
EGFRCR SERPLBLD CKD-EPI 2021: 99 ML/MIN/1.73M2
EOSINOPHIL # BLD AUTO: 119 CELLS/UL (ref 15–500)
EOSINOPHIL NFR BLD AUTO: 3.4 %
ERYTHROCYTE [DISTWIDTH] IN BLOOD BY AUTOMATED COUNT: 13.1 % (ref 11–15)
GLOBULIN SER CALC-MCNC: 2.4 G/DL (CALC) (ref 1.9–3.7)
GLUCOSE SERPL-MCNC: 95 MG/DL (ref 65–99)
HCT VFR BLD AUTO: 44.1 % (ref 38.5–50)
HGB BLD-MCNC: 14.9 G/DL (ref 13.2–17.1)
LDH SERPL P TO L-CCNC: 166 U/L (ref 120–250)
LYMPHOCYTES # BLD AUTO: 998 CELLS/UL (ref 850–3900)
LYMPHOCYTES NFR BLD AUTO: 28.5 %
MCH RBC QN AUTO: 29.2 PG (ref 27–33)
MCHC RBC AUTO-ENTMCNC: 33.8 G/DL (ref 32–36)
MCV RBC AUTO: 86.3 FL (ref 80–100)
MONOCYTES # BLD AUTO: 298 CELLS/UL (ref 200–950)
MONOCYTES NFR BLD AUTO: 8.5 %
NEUTROPHILS # BLD AUTO: 2037 CELLS/UL (ref 1500–7800)
NEUTROPHILS NFR BLD AUTO: 58.2 %
PLATELET # BLD AUTO: 157 THOUSAND/UL (ref 140–400)
PMV BLD REES-ECKER: 11.2 FL (ref 7.5–12.5)
POTASSIUM SERPL-SCNC: 4.1 MMOL/L (ref 3.5–5.3)
PROT SERPL-MCNC: 7 G/DL (ref 6.1–8.1)
PROT SERPL-MCNC: 7 G/DL (ref 6.1–8.1)
RBC # BLD AUTO: 5.11 MILLION/UL (ref 4.2–5.8)
SODIUM SERPL-SCNC: 141 MMOL/L (ref 135–146)
TSH SERPL-ACNC: 2.28 MIU/L (ref 0.4–4.5)
WBC # BLD AUTO: 3.5 THOUSAND/UL (ref 3.8–10.8)

## 2025-03-01 ENCOUNTER — APPOINTMENT (OUTPATIENT)
Dept: RADIOLOGY | Facility: HOSPITAL | Age: 50
End: 2025-03-01
Payer: COMMERCIAL

## 2025-03-04 ENCOUNTER — APPOINTMENT (OUTPATIENT)
Dept: PRIMARY CARE | Facility: CLINIC | Age: 50
End: 2025-03-04
Payer: COMMERCIAL

## 2025-03-04 ENCOUNTER — APPOINTMENT (OUTPATIENT)
Dept: RADIOLOGY | Facility: HOSPITAL | Age: 50
End: 2025-03-04
Payer: COMMERCIAL

## 2025-03-05 ENCOUNTER — APPOINTMENT (OUTPATIENT)
Dept: PHYSICAL THERAPY | Facility: CLINIC | Age: 50
End: 2025-03-05
Payer: COMMERCIAL

## 2025-03-10 ENCOUNTER — HOSPITAL ENCOUNTER (OUTPATIENT)
Dept: RADIOLOGY | Facility: EXTERNAL LOCATION | Age: 50
Discharge: HOME | End: 2025-03-10
Payer: COMMERCIAL

## 2025-03-12 ENCOUNTER — APPOINTMENT (OUTPATIENT)
Dept: PHYSICAL THERAPY | Facility: CLINIC | Age: 50
End: 2025-03-12
Payer: COMMERCIAL

## 2025-03-12 ENCOUNTER — TELEMEDICINE (OUTPATIENT)
Dept: HEMATOLOGY/ONCOLOGY | Facility: HOSPITAL | Age: 50
End: 2025-03-12
Payer: COMMERCIAL

## 2025-03-12 DIAGNOSIS — J98.59 MEDIASTINAL MASS: Primary | ICD-10-CM

## 2025-03-12 PROCEDURE — 99204 OFFICE O/P NEW MOD 45 MIN: CPT | Performed by: INTERNAL MEDICINE

## 2025-03-12 ASSESSMENT — ENCOUNTER SYMPTOMS
MYALGIAS: 0
DIAPHORESIS: 0
WEAKNESS: 0
ABDOMINAL PAIN: 0
CHILLS: 0
VOMITING: 0
COUGH: 0
NUMBNESS: 0
CHANGE IN BOWEL HABIT: 0
VERTIGO: 0
SWOLLEN GLANDS: 0
JOINT SWELLING: 0
NECK PAIN: 0
NAUSEA: 0
FEVER: 0
ANOREXIA: 0
FATIGUE: 0
VISUAL CHANGE: 0
SORE THROAT: 0
ARTHRALGIAS: 0
HEADACHES: 0

## 2025-03-12 NOTE — PROGRESS NOTES
Patient ID: Louie Hunyh is a 50 y.o. male.  Referring Physician: No referring provider defined for this encounter.  Primary Care Provider: No primary care provider on file.     DIAGNOSIS    Anterior mediastinal mass.  First seen on CT of chest imaging dated February 25, 2025.      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  He noticed it being dark, purpleish with a raised 1 cm middle area.  He was seen by a dermatologist in [place] given clotrimazole and at 1 time some steroids.  The lesion was itching.  Ultimately the itching went down but the lesion remained there and has not bothered him as much.  In the summer 2024 he noticed additional rash in both armpits and seen by dermatology.  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  They also gave him a quinolone for bacterial infection of the skin.  He also noticed since October 2024 some itching and hyperemia of his hands.  Blood work done in September 2024 shows mild leukopenia at 3400 and mild thrombocytopenia at 145,000.  There was no anemia, no macrocytosis.  C-reactive protein was normal.  PSA was normal.  Coagulation screen was normal, vitamin D was normal, TSH was normal.  He went skiing [place] in [place] and had some mild shortness of breath on exertion and in January developed some fever and upper respiratory infection.  He also developed in mid January left dry eye and seen by ophthalmology and was diagnosed with a corneal abrasion.  He again had a URI in February 2025 with some lingering cough which led him to sleep on the couch for 2 weeks given the fact that lying down would make him cough.  It is now significantly improved.  Given these URI symptoms he had a chest x-ray which showed an abnormality which led to a CT scan of the chest without IV contrast dated February 25, 2025.  This showed an anterior mediastinal mass measuring 4.2 cm.  And noncalcified pulmonary nodule was also seen measuring 8 mm in the right lower lobe.  An MRI of the chest was done on March 3, 2025 showing a well-defined soft tissue mass in the anterior mediastinum most likely representing a thymoma measuring 5.5 x 5 x 3.7 cm.  There was no fatty component or adjacent structure invasion.  A PET scan dated March 4, 2025 showed some uptake within the anterior mediastinal mass with a SUV of 4.  This is only mildly hypermetabolic.  He has been seen by thoracic surgery with plans for surgical resection.      PAST MEDICAL HISTORY    1- Vitamin B12 deficiency diagnosed in Sept 2023, took replacement therapy for several months.  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       Review of Systems   Constitutional:  Negative for chills, diaphoresis, fatigue and fever.   HENT:   Negative for congestion and sore throat.    Respiratory:  Negative for cough.    Cardiovascular:  Negative for chest pain.   Gastrointestinal:  Negative for abdominal pain, anorexia, change in bowel habit, nausea and vomiting.   Musculoskeletal:  Negative for arthralgias, joint swelling, myalgias and neck pain.   Skin:  Negative for rash.   Neurological:  Negative for headaches, numbness, vertigo and weakness.        Objective   BSA: There is no height or weight on file to calculate BSA.  There were no vitals taken for this visit.    Physical Exam  No physical exam, virtual visit    Performance Status:  Asymptomatic    CT CHEST WO IV CONTRAST; 2/25/2025   IMPRESSION:  Anterior mediastinal mass which is of uncertain etiology.  Differential considerations include thymoma, lymphoma, germ-cell  tumor, or enlarged lymph node. Malignancy needs to be excluded.  8 mm nodule in the right lower lobe which can be re-evaluated as per  Fleischner society guidelines.    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  The characteristics of the anterior mediastinal mass shows a very well-delineated (no fatty or induration structural invasion and no lymphadenopathy) lesion suggestive of a thymoma.  Other differential diagnoses include a substernal thyroid.  The clinical picture is not compatible with a lymphoma, however ultimately  histology is required.  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  Indeed the PET scan has only very mild uptake above the mediastinal pool with an SUV of 4.  I do agree that this should be surgically removed.  Of note Dr. Huynh has had a number of other medical findings over the years that could be paraneoplastic in nature including vitamin B12 deficiency, possible psoriasis, chronic mild pancytopenia.     In addition to surgical resection I recommended obtaining folate and B12 levels and checking quantitative immunoglobulins including IgA, IgM and IgG.  We will be discussing the histologic findings after surgical resection.  It was an honor for me to see him and I believe he is in great surgical hands.               Elliot Botello MD

## 2025-03-12 NOTE — PROGRESS NOTES
Subjective   Louie Huynh  is a 50 y.o. male who presents for evaluation of 4.2 cm anterior mediastinal mass with SUV 4.0.     Huyhn has a mediastinal mass.  He had a URI in February 2025 with some lingering cough.  Given these URI symptoms he had a chest x-ray which showed an abnormality which led to a CT scan of the chest without IV contrast dated February 25, 2025.  This showed an anterior mediastinal mass measuring 4.2 cm.  And noncalcified pulmonary nodule was also seen measuring 8 mm in the right lower lobe.  An MRI of the chest was done on March 3, 2025 showing a well-defined soft tissue mass in the anterior mediastinum most likely representing a thymoma measuring 5.5 x 5 x 3.7 cm.  There was no fatty component or adjacent structure invasion.  A PET scan dated March 4, 2025 showed some uptake within the anterior mediastinal mass with a SUV of 4.  This is only mildly hypermetabolic.  He has been seen by thoracic surgery at [facility] with plans for surgical resection.      PAST MEDICAL HISTORY     1- Vitamin B12 deficiency diagnosed in Sept 2023, took replacement therapy for several months.  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 He denies the following symptoms: chest pain, shortness of breath at rest, shortness of breath with activity, cough, hemoptysis, fevers, chills, and weight loss.      There have been no significant changes to their documented medical, surgical and family history.     He  reports that he has never smoked. He has never used smokeless tobacco. He reports that he does not drink alcohol and  does not use drugs.    Objective   Physical Exam  The patient is well-appearing and in no acute distress. The trachea is midline and there is no crepitus. The lungs were clear to auscultation grossly. There was good effort and excursion. The heart had a regular rate and rhythm. The abdomen was soft, nontender and nondistended. The extremities had no edema or gross deformities. Mood and affect are appropriate.    Diagnostic Studies  I reviewed the test results described in the HPI    Assessment/Plan   I believe that the patient has a diagnosis of anterior mediastinal mass .     This patient has a anterior mediastinal mass concerning for neoplastic process.  Based on its appearance I believe this is very likely to be a thymoma.  In this setting, primary surgical resection is reasonable as it appears to be localized.  The patient is a reasonable candidate for surgery, and I believe a minimally invasive approach was technically feasible.  My personal approach would be to offer the patient a left robotic resection with possible right thoracoscopic evaluation of the phrenic nerve.  This is typically associated with a hospital stay of 1 to 2 days and a recovery time of 4 to 6 weeks.  Certainly, this patient is in excellent health, and his recovery and hospital stay may be shorter.  The patient endorsed a preference towards having the procedure performed at another institution, and I believe this is reasonable.  Certainly if he would like his care provided at [facility] I would happily accommodate him    I recommend surgery by others.     I discussed this in detail with the patient, including a discussion of alternatives. They were comfortable with this approach.     Bjorn Rivera MD  [phone]

## 2025-03-13 ENCOUNTER — APPOINTMENT (OUTPATIENT)
Dept: RADIOLOGY | Facility: CLINIC | Age: 50
End: 2025-03-13
Payer: COMMERCIAL

## 2025-03-13 ENCOUNTER — OFFICE VISIT (OUTPATIENT)
Dept: SURGERY | Facility: HOSPITAL | Age: 50
End: 2025-03-13
Payer: COMMERCIAL

## 2025-03-13 VITALS
TEMPERATURE: 97.2 F | RESPIRATION RATE: 18 BRPM | OXYGEN SATURATION: 98 % | WEIGHT: 184.8 LBS | HEART RATE: 56 BPM | BODY MASS INDEX: 28.1 KG/M2 | DIASTOLIC BLOOD PRESSURE: 80 MMHG | SYSTOLIC BLOOD PRESSURE: 130 MMHG

## 2025-03-13 DIAGNOSIS — J98.59 MEDIASTINAL MASS: Primary | ICD-10-CM

## 2025-03-13 PROCEDURE — 99205 OFFICE O/P NEW HI 60 MIN: CPT | Performed by: THORACIC SURGERY (CARDIOTHORACIC VASCULAR SURGERY)

## 2025-03-13 PROCEDURE — 99215 OFFICE O/P EST HI 40 MIN: CPT | Performed by: THORACIC SURGERY (CARDIOTHORACIC VASCULAR SURGERY)

## 2025-03-13 ASSESSMENT — PAIN SCALES - GENERAL: PAINLEVEL_OUTOF10: 0-NO PAIN

## 2025-04-16 ENCOUNTER — PATIENT OUTREACH (OUTPATIENT)
Dept: CARE COORDINATION | Facility: CLINIC | Age: 50
End: 2025-04-16
Payer: COMMERCIAL

## 2025-04-16 NOTE — PROGRESS NOTES
Reviewed chart prior to patient outreach. Outreach call to patient to support a smooth transition of care from recent admission.    Discharge facility: [facility]  Discharge diagnosis: Anterior mediastinal mass   DATE OF SURGERY: 4/9/2025  SURGERY: Robotic-assisted maximal thymectomy with mediastinal lymph node dissection and intercostal nerve blocks    Admission date: 4/9/25  Discharge date:  4/12/25    Specialist Appointment Date:  4/15/25 thoracic surgery    Spoke with patient, reviewed discharge medications, discharge instructions, assessed social needs, and provided education on importance of follow-up appointment with provider.     See  Hospital Encounter and Summary, and Discharge assessment below for further details. Information obtained from discharge summary from EMR.    Preoperative Hospital Course: 50 year old never smoker who presented with 1-2 weeks of cough/SOB, underwent CXR 2/21/25 which revealed an 8mm pulmonary nodule at right lung base. Dedicated CT chest incidentally revealed an anterior mediastinal mass abutting the distal/superior aspect of ascending aorta, measuring approximately 4.2 cm in size. PET/CT 3/4/25 showed mild FDG uptake associated with the 4.2 cm mediastinal mass (SUV 4.0) and no uptake associated with the 8 mm right lower lobe pulmonary nodule. He came to [facility] for second opinion, resection was recommended. He now presents for surgery.     CM outreach:  Wrap Up  Wrap Up Additional Comments: Patient is doing well at time of outreach. Pain is manageable. He is following up with thoracic surgery. Conversation shortened due to patient on other line. (4/16/2025  1:13 PM)    Medications  Medications reviewed with patient/caregiver?: Yes (4/16/2025  1:13 PM)  Is the patient having any side effects they believe may be caused by any medication additions or changes?: No (4/16/2025  1:13 PM)  Does the patient have all medications ordered at discharge?: Yes (4/16/2025  1:13 PM)  Care  Management Interventions: No intervention needed (4/16/2025  1:13 PM)  Is the patient taking all medications as directed (includes completed medication regime)?: Yes (4/16/2025  1:13 PM)    Appointments  Does the patient have a primary care provider?: Yes (4/16/2025  1:13 PM)  Care Management Interventions: Verified appointment date/time/provider (4/16/2025  1:13 PM)  Has the patient kept scheduled appointments due by today?: Yes (4/16/2025  1:13 PM)    Patient Teaching  Does the patient have access to their discharge instructions?: Yes (4/16/2025  1:13 PM)  Care Management Interventions: Reviewed instructions with patient (4/16/2025  1:13 PM)  What is the patient's perception of their health status since discharge?: Improving (4/16/2025  1:13 PM)  Is the patient/caregiver able to teach back the hierarchy of who to call/visit for symptoms/problems? PCP, Specialist, Home Health nurse, Urgent Care, ED, 911: Yes (4/16/2025  1:13 PM)    Patient with no additional needs noted. No additional outreach needed at this time.  Provided patient with my contact information for potential needs.    Emili Wadsworth RN BSN  [employer] Care Manager  [phone]

## 2025-05-07 ENCOUNTER — ALLIED HEALTH (OUTPATIENT)
Dept: INTEGRATIVE MEDICINE | Facility: CLINIC | Age: 50
End: 2025-05-07
Payer: COMMERCIAL

## 2025-05-07 DIAGNOSIS — M25.571 RIGHT ANKLE PAIN, UNSPECIFIED CHRONICITY: ICD-10-CM

## 2025-05-07 DIAGNOSIS — G89.18 POST-OP PAIN: Primary | ICD-10-CM

## 2025-05-07 PROCEDURE — 97811 ACUP 1/> W/O ESTIM EA ADD 15: CPT | Performed by: NATUROPATH

## 2025-05-07 PROCEDURE — 97810 ACUP 1/> WO ESTIM 1ST 15 MIN: CPT | Performed by: NATUROPATH

## 2025-05-07 NOTE — PROGRESS NOTES
Acupuncture Visit:     Subjective   Patient ID: Louie Huynh is a 50 y.o. male who presents for No chief complaint on file.  Pain shoulder upper back and rightankle    Pain is centered aorund in    Nerveine prosze     Has nnot tried arnica    Sleep is off, had trouble sleepingd ue to pain  Cintia rnow,  but stil toruble finedid god position    Appetie good  Bmn ormae    Rigt ankle nateral pain.                       Review of Systems         Provider reviewed plan for the acupuncture session, precautions and contraindications. Patient/guardian/hospital staff has given consent to treat with full understanding of what to expect during the session. Before acupuncture began, provider explained to the patient to communicate at any time if the procedure was causing discomfort past their tolerance level. Patient agreed to advise acupuncturist. The acupuncturist counseled the patient on the risks of acupuncture treatment including pain, infection, bleeding, and no relief of pain. The patient was positioned comfortably. There was no evidence of infection at the site of needle insertions.    Objective   Physical Exam                             Assessment/Plan   {[email or web address]/[email or web address]:19557}

## 2025-09-16 ENCOUNTER — APPOINTMENT (OUTPATIENT)
Dept: PRIMARY CARE | Facility: CLINIC | Age: 50
End: 2025-09-16
Payer: COMMERCIAL

## 2025-11-25 ENCOUNTER — APPOINTMENT (OUTPATIENT)
Dept: PRIMARY CARE | Facility: CLINIC | Age: 50
End: 2025-11-25
Payer: COMMERCIAL